# Patient Record
Sex: MALE | Race: OTHER | NOT HISPANIC OR LATINO | URBAN - METROPOLITAN AREA
[De-identification: names, ages, dates, MRNs, and addresses within clinical notes are randomized per-mention and may not be internally consistent; named-entity substitution may affect disease eponyms.]

---

## 2023-01-08 ENCOUNTER — EMERGENCY (EMERGENCY)
Facility: HOSPITAL | Age: 63
LOS: 0 days | Discharge: HOME | End: 2023-01-08
Attending: STUDENT IN AN ORGANIZED HEALTH CARE EDUCATION/TRAINING PROGRAM | Admitting: STUDENT IN AN ORGANIZED HEALTH CARE EDUCATION/TRAINING PROGRAM
Payer: MEDICAID

## 2023-01-08 VITALS
DIASTOLIC BLOOD PRESSURE: 63 MMHG | HEART RATE: 66 BPM | TEMPERATURE: 98 F | SYSTOLIC BLOOD PRESSURE: 117 MMHG | RESPIRATION RATE: 17 BRPM | OXYGEN SATURATION: 98 %

## 2023-01-08 VITALS
RESPIRATION RATE: 14 BRPM | OXYGEN SATURATION: 97 % | TEMPERATURE: 97 F | HEART RATE: 71 BPM | SYSTOLIC BLOOD PRESSURE: 139 MMHG | DIASTOLIC BLOOD PRESSURE: 73 MMHG
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
## 2023-01-08 DIAGNOSIS — S99.911A UNSPECIFIED INJURY OF RIGHT ANKLE, INITIAL ENCOUNTER: ICD-10-CM

## 2023-01-08 DIAGNOSIS — S89.81XA OTHER SPECIFIED INJURIES OF RIGHT LOWER LEG, INITIAL ENCOUNTER: ICD-10-CM

## 2023-01-08 DIAGNOSIS — G47.33 OBSTRUCTIVE SLEEP APNEA (ADULT) (PEDIATRIC): ICD-10-CM
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
## 2023-01-08 DIAGNOSIS — E11.9 TYPE 2 DIABETES MELLITUS WITHOUT COMPLICATIONS: ICD-10-CM

## 2023-01-08 DIAGNOSIS — J45.909 UNSPECIFIED ASTHMA, UNCOMPLICATED: ICD-10-CM

## 2023-01-08 DIAGNOSIS — Z87.891 PERSONAL HISTORY OF NICOTINE DEPENDENCE: ICD-10-CM

## 2023-01-08 PROCEDURE — 73562 X-RAY EXAM OF KNEE 3: CPT | Mod: 26,RT

## 2023-01-08 PROCEDURE — 73590 X-RAY EXAM OF LOWER LEG: CPT | Mod: 26,RT

## 2023-01-08 PROCEDURE — 99284 EMERGENCY DEPT VISIT MOD MDM: CPT | Mod: 25

## 2023-01-08 PROCEDURE — 29515 APPLICATION SHORT LEG SPLINT: CPT

## 2023-01-08 PROCEDURE — 73610 X-RAY EXAM OF ANKLE: CPT | Mod: 26,RT

## 2023-01-08 RX ORDER — IBUPROFEN 200 MG
600 TABLET ORAL ONCE
Refills: 0 | Status: COMPLETED | OUTPATIENT
Start: 2023-01-08 | End: 2023-01-08

## 2023-01-08 RX ADMIN — Medication 600 MILLIGRAM(S): at 15:48

## 2023-01-08 NOTE — ED PROVIDER NOTE - NSICDXPASTMEDICALHX_GEN_ALL_CORE_FT
What Type Of Note Output Would You Prefer (Optional)?: Bullet Format How Severe Is Your Acne?: moderate Is This A New Presentation, Or A Follow-Up?: Follow Up Acne PAST MEDICAL HISTORY:  Asthma     Diabetes     MAYLIN (obstructive sleep apnea)

## 2023-01-08 NOTE — ED ADULT NURSE NOTE - NSIMPLEMENTINTERV_GEN_ALL_ED
Implemented All Universal Safety Interventions:  [place] to call system. Call bell, personal items and telephone within reach. Instruct patient to call for assistance. Room bathroom lighting operational. Non-slip footwear when patient is off stretcher. Physically safe environment: no spills, clutter or unnecessary equipment. Stretcher in lowest position, wheels locked, appropriate side rails in place. Implemented All Fall with Harm Risk Interventions:  [place] to call system. Call bell, personal items and telephone within reach. Instruct patient to call for assistance. Room bathroom lighting operational. Non-slip footwear when patient is off stretcher. Physically safe environment: no spills, clutter or unnecessary equipment. Stretcher in lowest position, wheels locked, appropriate side rails in place. Provide visual cue, wrist band, yellow gown, etc. Monitor gait and stability. Monitor for mental status changes and reorient to person, place, and time. Review medications for side effects contributing to fall risk. Reinforce activity limits and safety measures with patient and family. Provide visual clues: red socks.

## 2023-01-08 NOTE — ED PROVIDER NOTE - PATIENT PORTAL LINK FT
You can access the FollowMyHealth Patient Portal offered by [facility] by registering at the following website: [email or web address]. By joining [facility]’s FollowMyHealth portal, you will also be able to view your health information using other applications (apps) compatible with our system.

## 2023-01-08 NOTE — ED PROVIDER NOTE - NSFOLLOWUPINSTRUCTIONS_ED_ALL_ED_FT
Our Emergency Department Referral Coordinators will be reaching out ot you in the next 24-48 hours from 9:00am to 5:00pm (Monday to Friday) with a follow up appointment. Please expect a phone call from the hospital in that time frame. If you do not receive a call or if you have any questions or concerns, you can reach them at [phone] or [phone].    Knee Pain    WHAT YOU NEED TO KNOW:    What do I need to know about knee pain? Knee pain may start suddenly, or it may be a long-term problem. You may have pain on the side, front, or back of your knee. You may have knee stiffness and swelling. You may hear popping sounds or feel like your knee is giving way or locking up as you walk. You may feel pain when you sit, stand, walk, or climb up and down stairs.    What increases my risk for knee pain?     Obesity      A strain or tear in ligaments or tendons      A leg fracture or knee dislocation      Overuse of your knee      Osteoarthritis, rheumatoid arthritis, or gout      An infection, tumor, or cyst in your knee      Shoes that are not supportive, or training on a hard surface      Sports that involve jumping or pivoting on your knee    How is the cause of knee pain diagnosed? Your healthcare provider will examine your knee and ask about your symptoms. Tell your provider when the pain started and what you were doing at the time. Describe the pain, such as sharp, throbbing, or achy. Tell your provider about any knee injury or surgery you had. You may need any of the following:     MRI, CT, or ultrasound pictures may show an injury, fracture, or tumor.       Blood tests may be used to check the level of inflammation in your blood. The tests may also show signs of infection.      Arthroscopy is a procedure to look inside your knee joint with an arthroscope. An arthroscope is a flexible tube with a light and camera on the end. A knee arthroscopy is usually done to check for disease or damage inside your knee. These problems may be fixed during the procedure.    How is knee pain treated? Treatment will depend on the cause of your pain. You may need any of the following:     NSAIDs help decrease swelling and pain or fever. This medicine is available with or without a doctor's order. NSAIDs can cause stomach bleeding or kidney problems in certain people. If you take blood thinner medicine, always ask your healthcare provider if NSAIDs are safe for you. Always read the medicine label and follow directions.      Acetaminophen decreases pain and fever. It is available without a doctor's order. Ask how much to take and how often to take it. Follow directions. Read the labels of all other medicines you are using to see if they also contain acetaminophen, or ask your doctor or pharmacist. Acetaminophen can cause liver damage if not taken correctly. Do not use more than 4 grams (4,000 milligrams) total of acetaminophen in one day.       Prescription pain medicine may be given. Ask your healthcare provider how to take this medicine safely. Some prescription pain medicines contain acetaminophen. Do not take other medicines that contain acetaminophen without talking to your healthcare provider. Too much acetaminophen may cause liver damage. Prescription pain medicine may cause constipation. Ask your healthcare provider how to prevent or treat constipation.       Steroid injections may be given into your knee. Steroids reduce inflammation and pain.      Surgery may be used for some injuries, such as to repair a torn ACL.    What can I do to manage my symptoms?     Rest your knee so it can heal. Limit activities that increase your pain. Do low-impact exercises, such as walking or swimming.       Apply ice to help reduce swelling and pain. Use an ice pack, or put crushed ice in a plastic bag. Cover it with a towel before you apply it to your knee. Apply ice for 15 to 20 minutes every hour, or as directed.      Apply compression to help reduce swelling. Use a brace or bandage only as directed.      Elevate your knee to help decrease pain and swelling. Elevate your knee while you are sitting or lying down. Prop your leg on pillows to keep your knee above the level of your heart.      Prevent your knee from moving as directed. Your healthcare provider may put on a cast or splint. You may need to wear a leg brace to stabilize your knee. A leg brace can be adjusted to increase your range of motion as your knee heals.Hinged Knee Braces          What can I do to prevent knee pain?     Maintain a healthy weight. Extra weight increases your risk for knee pain. Ask your healthcare provider how much you should weigh. He or she can help you create a safe weight loss plan if you need to lose weight.      Exercise or train properly. Use the correct equipment for sports. Wear shoes that provide good support. Check your posture often as you exercise, play sports, or train for an event. This can help prevent stress and strain on your knees. Rest between sessions so you do not overwork your knees.    When should I seek immediate care?     Your pain is worse, even after treatment.       You cannot bend or straighten your leg completely.       The swelling around your knee does not go down even with treatment.      Your knee is painful and hot to the touch.     When should I contact my healthcare provider?     You have questions or concerns about your condition or care.         CARE AGREEMENT:    You have the right to help plan your care. Learn about your health condition and how it may be treated. Discuss treatment options with your healthcare providers to decide what care you want to receive. You always have the right to refuse treatment.     Ankle Sprain    An ankle sprain is a stretch or tear in one of the tough, fiber-like tissues (ligaments) in the ankle. The ligaments in your ankle help to hold the bones of the ankle together.     CAUSES  This condition is often caused by stepping on or falling on the outer edge of the foot.    RISK FACTORS  This condition is more likely to develop in people who play sports.    SYMPTOMS  Symptoms of this condition include:    Pain in your ankle.  Swelling.  Bruising. Bruising may develop right after you sprain your ankle or 1–2 days later.  Trouble standing or walking, especially when you turn or change directions.    DIAGNOSIS  This condition is diagnosed with a physical exam. During the exam, your health care provider will press on certain parts of your foot and ankle and try to move them in certain ways. X-rays may be taken to see how severe the sprain is and to check for broken bones.    TREATMENT  This condition may be treated with:    A brace. This is used to keep the ankle from moving until it heals.  An elastic bandage. This is used to support the ankle.  Crutches.  Pain medicine.  Surgery. This may be needed if the sprain is severe.  Physical therapy. This may help to improve the range of motion in the ankle.    HOME CARE INSTRUCTIONS  Rest your ankle.  Take over-the-counter and prescription medicines only as told by your health care provider.  For 2–3 days, keep your ankle raised (elevated) above the level of your heart as much as possible.  If directed, apply ice to the area:  Put ice in a plastic bag.  Place a towel between your skin and the bag.  Leave the ice on for 20 minutes, 2–3 times a day.  If you were given a brace:  Wear it as directed.  Remove it to shower or bathe.  Try not to move your ankle much, but wiggle your toes from time to time. This helps to prevent swelling.  If you were given an elastic bandage (dressing):  Remove it to shower or bathe.  Try not to move your ankle much, but wiggle your toes from time to time. This helps to prevent swelling.  Adjust the dressing to make it more comfortable if it feels too tight.  Loosen the dressing if you have numbness or tingling in your foot, or if your foot becomes cold and blue.  If you have crutches, use them as told by your health care provider. Continue to use them until you can walk without feeling pain in your ankle.    SEEK MEDICAL CARE IF:  You have rapidly increasing bruising or swelling.  Your pain is not relieved with medicine.    SEEK IMMEDIATE MEDICAL CARE IF:  Your toes or foot becomes numb or blue.  You have severe pain that gets worse.    ADDITIONAL NOTES AND INSTRUCTIONS    Please follow up with your Primary MD in 24-48 hr.  Seek immediate medical care for any new/worsening signs or symptoms.

## 2023-01-08 NOTE — ED PROVIDER NOTE - OBJECTIVE STATEMENT
62-year-old male with history of diabetes, asthma, sleep apnea, glaucoma presents to the ED status post missed last step twisting right ankle and falling on knee.  Patient denies head trauma, LOC, dizziness or weakness.

## 2023-01-08 NOTE — ED PROVIDER NOTE - CLINICAL SUMMARY MEDICAL DECISION MAKING FREE TEXT BOX
.    63 y/o M pmh as noted, p/w  R ankle and knee pain s/p twisting ankle then falling on knee. No weakness numbness or other injury.    Pt is NAD; + swelling and ttp R lat mal; joints NL ROM; NL pulses, motor and sensory; + antalgic gait.    All available lab tests, imaging tests, and EKGs independently reviewed and interpreted by me. xray no frx or dislocation.    IMP: ankle sprain, knee pain.  splint applied.  Pt stable for dc w/ outpt f/up, and care as discussed.  Pt understands plan and signs and symptoms for ED return. DC home.     .

## 2023-01-08 NOTE — ED ADULT NURSE NOTE - CAS EDP DISCH TYPE
How Severe Is Your Skin Lesion?: mild
Have Your Skin Lesions Been Treated?: not been treated
Is This A New Presentation, Or A Follow-Up?: Skin Lesions
Home

## 2023-01-09 NOTE — ED PROCEDURE NOTE - NS ED FORMED SPLINTS 1
Dolor Abdominal    El dolor abdominal es dolor en el vientre o en la kvng del intestino. Todo el dion tiene tim tipo de dolor de vez en cuando. En muchos casos el dolor desaparece por sí solo. Kimberly en ciertas ocasiones el dolor abdominal puede ser consecuencia de un problema grave, sammi por ejemplo hazel apendicitis. Por lo tanto, es importante saber cuándo se debe obtener ayuda.  Causas del dolor abdominal  El dolor abdominal puede tener muchas causas. Entre las causas más comunes, en los adultos, se encuentran las siguientes:   · Estreñimiento, diarrea o gases  · Reflujo gastroesofágico (desplazamiento del ácido estomacal hacia el esófago, también llamado reflujo ácido o ardor estomacal)  · Úlcera péptica (lesión en el revestimiento interno del estómago o del intestino duarte)  · Inflamación de la vesícula biliar, el hígado o el páncreas  · Cálculos biliares o renales  · Apendicitis  · Obstrucción del intestino  · Hernia (protrusión o abultamiento de un órgano interno a través de un músculo u otro tejido)  · Infecciones de las vías urinarias  · En las mujeres, cólicos menstruales, fibromas o endometriosis del útero  · Inflamación o infección del intestino  Diagnóstico de la causa del dolor abdominal  Hoskins proveedor de atención médica le hará un examen para ayudar a determinar la causa de hoskins dolor. En elina necesario, le harán ciertas pruebas. El dolor abdominal puede ser difícil de diagnosticar porque freda causas pueden ser muy diversas. Los detalles que usted sepa gayle acerca de hoskins dolor pueden resultar útiles. Diga a hoskins proveedor de atención médica dónde y cuándo siente el dolor y qué cosas lo alivian o lo empeoran. Mencione también si tiene otros síntomas sammi fiebre, cansancio, náuseas, vómito o cambios en la frecuencia con que evacúa freda intestinos o hoskins vejiga.  Tratamiento del dolor abdominal  Ciertas causas de tim dolor, sammi hazel apendicitis o hazel obstrucción intestinal, requieren tratamiento  urgente. Otros problemas pueden tratarse mediante descanso, consumo de líquidos o medicamentos. Hoskins proveedor de atención médica le dará instrucciones específicas para el tratamiento que debe seguir o cómo debe cuidarse según la causa de hosikns dolor.   Si tiene vómito o diarrea, carolyn pequeños sorbos de agua u otros líquidos silvio. Cuando esté listo para comer de nuevo alimentos sólidos, empiece comiendo pequeñas cantidades de cosas fáciles de digerir, sammi puré de manzanas, pan henrik o galletas saladas.   Cuándo debe llamar al médico  Llame al 911 o vaya al \[facility] [place] [facility]\"" de inmediato si tiene alguno de los siguientes síntomas:  · No puede defecar y está vomitando  · Está vomitando jovanny o tiene diarrea de color negruzco o muy oscuro  · Tiene también dolor en el pecho, en el ayde o en el hombro  · Siente que está a punto de desmayarse  · Tiene dolor en los omóplatos, con náuseas  · Tiene un dolor repentino e insoportable en el abdomen  · Tiene un nuevo dolor distinto de todos los leora que ha tenido antes  · Tiene el vientre rígido, arben y sensible al tacto  Llame a hoskins médico si tiene:  · Dolor lisette más de 5 días  · Abotargamiento (sensación de llenura e inflamiento) lisette más de 2 días  · Diarrea lisette más de 5 días  · Fiebre superior a 101°F o más  · Dolor que continúa aumentando  · Pérdida de peso sin motivo aparente  · Falta de apetito persistente  · Jovanny en las heces  Cómo prevenir el dolor abdominal  Estos son algunos consejos para ayudar a prevenir el dolor abdominal:  · Coma cantidades más pequeñas de alimentos en cada comida.  · Evite los alimentos fritos o que contengan mucha grasa.  · Evite los alimentos que le producen gas.  · Roxanne ejercicio con regularidad.  · Carolyn abundantes líquidos.  Para ayudar a prevenir los síntomas del reflujo gastroesofágico:  · Deje de fumar.  · Carolyn menos alcohol y coma menos de esos alimentos que aumentan hoskins acidez estomacal.  · Pierda el exceso de peso.  · Termine de  comer sammi mínimo 2 horas antes de acostarse.  · Eleve un poco la cabecera de hoskins cama.  Date Last Reviewed: 3/30/2014  © 6569-0751 [employer]. [address] [place] [address], [place] [address]. Todos los derechos reservados. Esta información no pretende sustituir la atención médica profesional. Sólo hoskins médico puede diagnosticar y tratar un problema de magdi.         Posterior Splint

## 2023-01-13 PROBLEM — J45.909 UNSPECIFIED ASTHMA, UNCOMPLICATED: Chronic | Status: ACTIVE | Noted: 2023-01-08

## 2023-01-13 PROBLEM — E11.9 TYPE 2 DIABETES MELLITUS WITHOUT COMPLICATIONS: Chronic | Status: ACTIVE | Noted: 2023-01-08

## 2023-01-13 PROBLEM — G47.33 OBSTRUCTIVE SLEEP APNEA (ADULT) (PEDIATRIC): Chronic | Status: ACTIVE | Noted: 2023-01-08

## 2023-03-06 PROBLEM — Z00.00 ENCOUNTER FOR PREVENTIVE HEALTH EXAMINATION: Status: ACTIVE | Noted: 2023-03-06

## 2023-03-07 ENCOUNTER — APPOINTMENT (OUTPATIENT)
Dept: OPHTHALMOLOGY | Facility: CLINIC | Age: 63
End: 2023-03-07

## 2023-04-11 ENCOUNTER — APPOINTMENT (OUTPATIENT)
Dept: OPHTHALMOLOGY | Facility: CLINIC | Age: 63
End: 2023-04-11
Payer: MEDICAID

## 2023-04-11 ENCOUNTER — OUTPATIENT (OUTPATIENT)
Dept: OUTPATIENT SERVICES | Facility: HOSPITAL | Age: 63
LOS: 1 days | End: 2023-04-11
Payer: MEDICAID

## 2023-04-11 DIAGNOSIS — H53.8 OTHER VISUAL DISTURBANCES: ICD-10-CM

## 2023-04-11 PROCEDURE — 92133 CPTRZD OPH DX IMG PST SGM ON: CPT | Mod: 26

## 2023-04-11 PROCEDURE — 92133 CPTRZD OPH DX IMG PST SGM ON: CPT

## 2023-04-11 PROCEDURE — 92004 COMPRE OPH EXAM NEW PT 1/>: CPT

## 2023-04-18 DIAGNOSIS — H40.1134 PRIMARY OPEN-ANGLE GLAUCOMA, BILATERAL, INDETERMINATE STAGE: ICD-10-CM

## 2023-04-18 DIAGNOSIS — H17.13 CENTRAL CORNEAL OPACITY, BILATERAL: ICD-10-CM

## 2023-04-18 DIAGNOSIS — H18.13 BULLOUS KERATOPATHY, BILATERAL: ICD-10-CM

## 2023-04-18 DIAGNOSIS — H18.513 ENDOTHELIAL CORNEAL DYSTROPHY, BILATERAL: ICD-10-CM

## 2023-04-18 DIAGNOSIS — E11.9 TYPE 2 DIABETES MELLITUS WITHOUT COMPLICATIONS: ICD-10-CM

## 2023-10-17 ENCOUNTER — APPOINTMENT (OUTPATIENT)
Dept: OPHTHALMOLOGY | Facility: CLINIC | Age: 63
End: 2023-10-17

## 2025-06-05 ENCOUNTER — INPATIENT (INPATIENT)
Facility: HOSPITAL | Age: 65
LOS: 3 days | Discharge: HOME | DRG: 420 | End: 2025-06-09
Attending: STUDENT IN AN ORGANIZED HEALTH CARE EDUCATION/TRAINING PROGRAM | Admitting: STUDENT IN AN ORGANIZED HEALTH CARE EDUCATION/TRAINING PROGRAM
Payer: MEDICAID

## 2025-06-05 VITALS
HEART RATE: 83 BPM | SYSTOLIC BLOOD PRESSURE: 110 MMHG | DIASTOLIC BLOOD PRESSURE: 73 MMHG | TEMPERATURE: 99 F | WEIGHT: 235.01 LBS | OXYGEN SATURATION: 100 % | RESPIRATION RATE: 18 BRPM | HEIGHT: 71 IN

## 2025-06-05 DIAGNOSIS — R73.9 HYPERGLYCEMIA, UNSPECIFIED: ICD-10-CM

## 2025-06-05 LAB
ALBUMIN SERPL ELPH-MCNC: 4.2 G/DL — SIGNIFICANT CHANGE UP (ref 3.5–5.2)
ALP SERPL-CCNC: 71 U/L — SIGNIFICANT CHANGE UP (ref 30–115)
ALT FLD-CCNC: 27 U/L — SIGNIFICANT CHANGE UP (ref 0–41)
ANION GAP SERPL CALC-SCNC: 16 MMOL/L — HIGH (ref 7–14)
APPEARANCE UR: CLEAR — SIGNIFICANT CHANGE UP
AST SERPL-CCNC: 22 U/L — SIGNIFICANT CHANGE UP (ref 0–41)
B-OH-BUTYR SERPL-SCNC: 2.4 MMOL/L — HIGH
BASOPHILS # BLD AUTO: 0.01 K/UL — SIGNIFICANT CHANGE UP (ref 0–0.2)
BASOPHILS NFR BLD AUTO: 0.3 % — SIGNIFICANT CHANGE UP (ref 0–1)
BILIRUB SERPL-MCNC: 0.5 MG/DL — SIGNIFICANT CHANGE UP (ref 0.2–1.2)
BILIRUB UR-MCNC: NEGATIVE — SIGNIFICANT CHANGE UP
BUN SERPL-MCNC: 23 MG/DL — HIGH (ref 10–20)
CALCIUM SERPL-MCNC: 8.7 MG/DL — SIGNIFICANT CHANGE UP (ref 8.4–10.5)
CHLORIDE SERPL-SCNC: 102 MMOL/L — SIGNIFICANT CHANGE UP (ref 98–110)
CO2 SERPL-SCNC: 20 MMOL/L — SIGNIFICANT CHANGE UP (ref 17–32)
COLOR SPEC: YELLOW — SIGNIFICANT CHANGE UP
CREAT SERPL-MCNC: 1.3 MG/DL — SIGNIFICANT CHANGE UP (ref 0.7–1.5)
DIFF PNL FLD: ABNORMAL
EGFR: 62 ML/MIN/1.73M2 — SIGNIFICANT CHANGE UP
EGFR: 62 ML/MIN/1.73M2 — SIGNIFICANT CHANGE UP
EOSINOPHIL # BLD AUTO: 0 K/UL — SIGNIFICANT CHANGE UP (ref 0–0.7)
EOSINOPHIL NFR BLD AUTO: 0 % — SIGNIFICANT CHANGE UP (ref 0–8)
GAS PNL BLDV: SIGNIFICANT CHANGE UP
GLUCOSE BLDC GLUCOMTR-MCNC: 251 MG/DL — HIGH (ref 70–99)
GLUCOSE BLDC GLUCOMTR-MCNC: 334 MG/DL — HIGH (ref 70–99)
GLUCOSE SERPL-MCNC: 392 MG/DL — HIGH (ref 70–99)
GLUCOSE UR QL: >=1000 MG/DL
HCT VFR BLD CALC: 41.5 % — LOW (ref 42–52)
HGB BLD-MCNC: 14.3 G/DL — SIGNIFICANT CHANGE UP (ref 14–18)
IMM GRANULOCYTES NFR BLD AUTO: 0.3 % — SIGNIFICANT CHANGE UP (ref 0.1–0.3)
KETONES UR QL: 40 MG/DL
LEUKOCYTE ESTERASE UR-ACNC: NEGATIVE — SIGNIFICANT CHANGE UP
LIDOCAIN IGE QN: 90 U/L — HIGH (ref 7–60)
LYMPHOCYTES # BLD AUTO: 0.51 K/UL — LOW (ref 1.2–3.4)
LYMPHOCYTES # BLD AUTO: 15.7 % — LOW (ref 20.5–51.1)
MCHC RBC-ENTMCNC: 28.4 PG — SIGNIFICANT CHANGE UP (ref 27–31)
MCHC RBC-ENTMCNC: 34.5 G/DL — SIGNIFICANT CHANGE UP (ref 32–37)
MCV RBC AUTO: 82.3 FL — SIGNIFICANT CHANGE UP (ref 80–94)
MONOCYTES # BLD AUTO: 0.4 K/UL — SIGNIFICANT CHANGE UP (ref 0.1–0.6)
MONOCYTES NFR BLD AUTO: 12.3 % — HIGH (ref 1.7–9.3)
NEUTROPHILS # BLD AUTO: 2.31 K/UL — SIGNIFICANT CHANGE UP (ref 1.4–6.5)
NEUTROPHILS NFR BLD AUTO: 71.4 % — SIGNIFICANT CHANGE UP (ref 42.2–75.2)
NITRITE UR-MCNC: NEGATIVE — SIGNIFICANT CHANGE UP
NRBC BLD AUTO-RTO: 0 /100 WBCS — SIGNIFICANT CHANGE UP (ref 0–0)
PH UR: 6 — SIGNIFICANT CHANGE UP (ref 5–8)
PLATELET # BLD AUTO: 245 K/UL — SIGNIFICANT CHANGE UP (ref 130–400)
PMV BLD: 10.7 FL — HIGH (ref 7.4–10.4)
POTASSIUM SERPL-MCNC: 4.5 MMOL/L — SIGNIFICANT CHANGE UP (ref 3.5–5)
POTASSIUM SERPL-SCNC: 4.5 MMOL/L — SIGNIFICANT CHANGE UP (ref 3.5–5)
PROT SERPL-MCNC: 7.9 G/DL — SIGNIFICANT CHANGE UP (ref 6–8)
PROT UR-MCNC: NEGATIVE MG/DL — SIGNIFICANT CHANGE UP
RBC # BLD: 5.04 M/UL — SIGNIFICANT CHANGE UP (ref 4.7–6.1)
RBC # FLD: 12.6 % — SIGNIFICANT CHANGE UP (ref 11.5–14.5)
SODIUM SERPL-SCNC: 138 MMOL/L — SIGNIFICANT CHANGE UP (ref 135–146)
SP GR SPEC: 1.02 — SIGNIFICANT CHANGE UP (ref 1–1.03)
UROBILINOGEN FLD QL: 0.2 MG/DL — SIGNIFICANT CHANGE UP (ref 0.2–1)
WBC # BLD: 3.24 K/UL — LOW (ref 4.8–10.8)
WBC # FLD AUTO: 3.24 K/UL — LOW (ref 4.8–10.8)

## 2025-06-05 PROCEDURE — 80048 BASIC METABOLIC PNL TOTAL CA: CPT

## 2025-06-05 PROCEDURE — 70487 CT MAXILLOFACIAL W/DYE: CPT

## 2025-06-05 PROCEDURE — 82962 GLUCOSE BLOOD TEST: CPT

## 2025-06-05 PROCEDURE — D7140: CPT

## 2025-06-05 PROCEDURE — 84443 ASSAY THYROID STIM HORMONE: CPT

## 2025-06-05 PROCEDURE — 83735 ASSAY OF MAGNESIUM: CPT

## 2025-06-05 PROCEDURE — 85027 COMPLETE CBC AUTOMATED: CPT

## 2025-06-05 PROCEDURE — 94640 AIRWAY INHALATION TREATMENT: CPT

## 2025-06-05 PROCEDURE — 83036 HEMOGLOBIN GLYCOSYLATED A1C: CPT

## 2025-06-05 PROCEDURE — 80053 COMPREHEN METABOLIC PANEL: CPT

## 2025-06-05 PROCEDURE — D0140: CPT

## 2025-06-05 PROCEDURE — 85025 COMPLETE CBC W/AUTO DIFF WBC: CPT

## 2025-06-05 PROCEDURE — 71045 X-RAY EXAM CHEST 1 VIEW: CPT | Mod: 26

## 2025-06-05 PROCEDURE — 99223 1ST HOSP IP/OBS HIGH 75: CPT

## 2025-06-05 PROCEDURE — D0330: CPT

## 2025-06-05 PROCEDURE — 82010 KETONE BODYS QUAN: CPT

## 2025-06-05 PROCEDURE — D0230: CPT

## 2025-06-05 PROCEDURE — 36415 COLL VENOUS BLD VENIPUNCTURE: CPT

## 2025-06-05 PROCEDURE — 99285 EMERGENCY DEPT VISIT HI MDM: CPT

## 2025-06-05 PROCEDURE — 74177 CT ABD & PELVIS W/CONTRAST: CPT | Mod: 26

## 2025-06-05 RX ORDER — SODIUM CHLORIDE 9 G/1000ML
1000 INJECTION, SOLUTION INTRAVENOUS
Refills: 0 | Status: DISCONTINUED | OUTPATIENT
Start: 2025-06-05 | End: 2025-06-09

## 2025-06-05 RX ORDER — ACETAMINOPHEN 500 MG/5ML
650 LIQUID (ML) ORAL EVERY 6 HOURS
Refills: 0 | Status: DISCONTINUED | OUTPATIENT
Start: 2025-06-05 | End: 2025-06-09

## 2025-06-05 RX ORDER — DEXTROSE 50 % IN WATER 50 %
25 SYRINGE (ML) INTRAVENOUS ONCE
Refills: 0 | Status: DISCONTINUED | OUTPATIENT
Start: 2025-06-05 | End: 2025-06-09

## 2025-06-05 RX ORDER — ALBUTEROL SULFATE 2.5 MG/3ML
1 VIAL, NEBULIZER (ML) INHALATION DAILY
Refills: 0 | Status: DISCONTINUED | OUTPATIENT
Start: 2025-06-05 | End: 2025-06-09

## 2025-06-05 RX ORDER — MAGNESIUM, ALUMINUM HYDROXIDE 200-200 MG
30 TABLET,CHEWABLE ORAL EVERY 4 HOURS
Refills: 0 | Status: DISCONTINUED | OUTPATIENT
Start: 2025-06-05 | End: 2025-06-09

## 2025-06-05 RX ORDER — INSULIN GLARGINE-YFGN 100 [IU]/ML
25 INJECTION, SOLUTION SUBCUTANEOUS ONCE
Refills: 0 | Status: COMPLETED | OUTPATIENT
Start: 2025-06-05 | End: 2025-06-05

## 2025-06-05 RX ORDER — INSULIN LISPRO 100 U/ML
INJECTION, SOLUTION INTRAVENOUS; SUBCUTANEOUS
Refills: 0 | Status: DISCONTINUED | OUTPATIENT
Start: 2025-06-05 | End: 2025-06-09

## 2025-06-05 RX ORDER — ALBUTEROL SULFATE 2.5 MG/3ML
2 VIAL, NEBULIZER (ML) INHALATION EVERY 6 HOURS
Refills: 0 | Status: DISCONTINUED | OUTPATIENT
Start: 2025-06-05 | End: 2025-06-05

## 2025-06-05 RX ORDER — MELATONIN 5 MG
3 TABLET ORAL AT BEDTIME
Refills: 0 | Status: DISCONTINUED | OUTPATIENT
Start: 2025-06-05 | End: 2025-06-09

## 2025-06-05 RX ORDER — INSULIN LISPRO 100 U/ML
7 INJECTION, SOLUTION INTRAVENOUS; SUBCUTANEOUS
Refills: 0 | Status: DISCONTINUED | OUTPATIENT
Start: 2025-06-05 | End: 2025-06-06

## 2025-06-05 RX ORDER — DEXTROSE 50 % IN WATER 50 %
15 SYRINGE (ML) INTRAVENOUS ONCE
Refills: 0 | Status: DISCONTINUED | OUTPATIENT
Start: 2025-06-05 | End: 2025-06-09

## 2025-06-05 RX ORDER — HEPARIN SODIUM 1000 [USP'U]/ML
5000 INJECTION INTRAVENOUS; SUBCUTANEOUS EVERY 12 HOURS
Refills: 0 | Status: DISCONTINUED | OUTPATIENT
Start: 2025-06-05 | End: 2025-06-09

## 2025-06-05 RX ORDER — DEXTROSE 50 % IN WATER 50 %
12.5 SYRINGE (ML) INTRAVENOUS ONCE
Refills: 0 | Status: DISCONTINUED | OUTPATIENT
Start: 2025-06-05 | End: 2025-06-09

## 2025-06-05 RX ORDER — GLUCAGON 3 MG/1
1 POWDER NASAL ONCE
Refills: 0 | Status: DISCONTINUED | OUTPATIENT
Start: 2025-06-05 | End: 2025-06-09

## 2025-06-05 RX ORDER — KETOROLAC TROMETHAMINE 30 MG/ML
15 INJECTION, SOLUTION INTRAMUSCULAR; INTRAVENOUS ONCE
Refills: 0 | Status: DISCONTINUED | OUTPATIENT
Start: 2025-06-05 | End: 2025-06-05

## 2025-06-05 RX ORDER — ONDANSETRON HCL/PF 4 MG/2 ML
4 VIAL (ML) INJECTION EVERY 8 HOURS
Refills: 0 | Status: DISCONTINUED | OUTPATIENT
Start: 2025-06-05 | End: 2025-06-09

## 2025-06-05 RX ADMIN — Medication 1000 MILLILITER(S): at 14:39

## 2025-06-05 RX ADMIN — Medication 10 UNIT(S): at 19:21

## 2025-06-05 RX ADMIN — INSULIN GLARGINE-YFGN 25 UNIT(S): 100 INJECTION, SOLUTION SUBCUTANEOUS at 23:10

## 2025-06-05 RX ADMIN — Medication 1000 MILLILITER(S): at 18:41

## 2025-06-05 RX ADMIN — KETOROLAC TROMETHAMINE 15 MILLIGRAM(S): 30 INJECTION, SOLUTION INTRAMUSCULAR; INTRAVENOUS at 22:21

## 2025-06-05 NOTE — CONSULT NOTE ADULT - SUBJECTIVE AND OBJECTIVE BOX
GENERAL SURGERY CONSULT NOTE    Patient: LIUDMILA AVALOS , 65y (64)Male   MRN: [id number]  Location: [facility] ED  Visit: 25 Emergency  Date: 25 @ 20:08    HPI:  Patient is a 65 year old with a PMHx of DM1 who presents to the ED complaining of mild diffuse abdominal pain for past few days and multiple bouts of clear water like vomiting episodes since last night which prompted him to come to the ED today. Patient denies fevers, chills, night sweats, chest pain. Admits to diffuse mild abdominal pain and small amount of vomit last night. Passing gas and bowel movements yesterday and this morning. Physical exam is benign, soft, NT, ND. Imaging of the abdomen showing questionable SBO vs enteritis picture. Based on above history and exam, likely enteritis. Patient also has uncontrolled diabetes with sugars as high as 380s in ED.     PAST MEDICAL & SURGICAL HISTORY:  Diabetes      Asthma      MAYLIN (obstructive sleep apnea)          Home Medications:        VITALS:  T(F): 99 (25 @ 14:04), Max: 99 (25 @ 14:04)  HR: 83 (25 @ 14:04) (83 - 83)  BP: 110/73 (25 @ 14:04) (110/73 - 110/73)  RR: 18 (25 @ 14:04) (18 - 18)  SpO2: 100% (25 @ 14:04) (100% - 100%)    PHYSICAL EXAM:  General: NAD, AAOx3, calm and cooperative  Cardiac: RRR  Respiratory: normal respiratory effort  Abdomen: Soft, non-distended, non-tender, no rebound, no guarding.   Skin: Warm/dry, normal color, no jaundice      MEDICATIONS  (STANDING):    MEDICATIONS  (PRN):      LAB/STUDIES:                        14.3   3.24  )-----------( 245      ( 2025 14:34 )             41.5         138  |  102  |  23[H]  ----------------------------<  392[H]  4.5   |  20  |  1.3    Ca    8.7      2025 14:34    TPro  7.9  /  Alb  4.2  /  TBili  0.5  /  DBili  x   /  AST  22  /  ALT  27  /  AlkPhos  71  06-05      LIVER FUNCTIONS - ( 2025 14:34 )  Alb: 4.2 g/dL / Pro: 7.9 g/dL / ALK PHOS: 71 U/L / ALT: 27 U/L / AST: 22 U/L / GGT: x           Urinalysis Basic - ( 2025 16:33 )    Color: Yellow / Appearance: Clear / S.024 / pH: x  Gluc: x / Ketone: x  / Bili: Negative / Urobili: 0.2 mg/dL   Blood: x / Protein: Negative mg/dL / Nitrite: Negative   Leuk Esterase: Negative / RBC: 0 /HPF / WBC 0 /HPF   Sq Epi: x / Non Sq Epi: 1 /HPF / Bacteria: Negative /HPF      Urinalysis with Rflx Culture (collected 2025 16:33)      IMAGING:  < from: CT Abdomen and Pelvis w/ IV Cont (25 @ 16:19) >  CT ABDOMEN AND PELVIS IC   ORDERED BY: DON SANDHU     PROCEDURE DATE:  2025          INTERPRETATION:  CLINICAL INFORMATION: Abdominal pain. Vomiting diarrhea    COMPARISON: None.    CONTRAST/COMPLICATIONS:  IV Contrast:Omnipaque 350  95 cc administered   5 cc discarded  Oral Contrast: NONE.    PROCEDURE:  CT of the Abdomen and Pelvis was performed.  Sagittal and coronal reformats were performed.    FINDINGS:  LOWER CHEST: Within normal limits.    LIVER: Within normal limits.  BILE DUCTS: Normal caliber.  GALLBLADDER: Within normal limits.  SPLEEN: Within normal limits.  PANCREAS: Within normal limits.  ADRENALS: Within normal limits.  KIDNEYS/URETERS: Both kidneys demonstrate symmetric enhancement with no   hydronephrosis. There is hypodensities within the kidneys, some which are   too small to further characterize. In particular, there are hypodensities   within the left kidney which appear mildly hyperintense difficult to   evaluate. These measure up to 1.1 cm within the interpolar region 2.0 cm   in the upper pole.    BLADDER: Moderate distention the urinary bladder.  REPRODUCTIVE ORGANS: Ossifications are noted of the prostate gland. The   prostate gland measures 5.2 x 4.1 cm.    BOWEL: There are numerous mildly dilated loops of small bowel measuring   up to 3.1 cm. Relative area of transition is seen within the right lower   quadrant with more decompressed loops seen distally. Bowel loops to   appear to be thickened. Air and stool seen throughout the colon. Moderate   distention of the stomach with fluid.  PERITONEUM/RETROPERITONEUM: Within normal limits.  VESSELS: No aneurysmal dilatation. Mild atherosclerotic changes with   plaque.  LYMPH NODES: No lymphadenopathy.  ABDOMINAL WALL: Fat-containing left inguinal hernia. There is a focal   fat-containing umbilical hernia.  BONES: Degenerative changes. Disc space narrowing and osteophyte   formation appears most pronounced at L4-L5. Spinal stenosis and foraminal   stenosis appears most pronounced at L5-S1.    IMPRESSION:  Mildly dilated loops of small bowel with fluid. Relative area of   transition with collapsed loops of bowel seen distally. Findings may   reflect small bowel obstruction. Thickened bowel wall may reflect an   enteritis. Air and stool within the colon may reflect an early or partial   small bowel obstruction.      ASSESSMENT:  Patient is a 65 year old with a PMHx of DM1 who presents to the ED complaining of mild diffuse abdominal pain for past few days and multiple bouts of clear water like vomiting episodes since last night which prompted him to come to the ED today. Patient denies fevers, chills, night sweats, chest pain. Admits to diffuse mild abdominal pain and small amount of vomit last night. Passing gas and bowel movements yesterday and this morning. Physical exam is benign, soft, NT, ND. Imaging of the abdomen showing questionable SBO vs enteritis picture. Based on above history and exam, likely enteritis. Patient also has uncontrolled diabetes with sugars as high as 380s in ED.     Plan:  - Patient case and plan discussed with surgical attending  - No acute surgical intervention at this time  - Recommend medical admission for glucose control  - Surgery team to follow for now  - Hemodynamic monitoring  - Can have sips of clears for now and advance as tolerated  - Monitor BF     for surgery team    Above plan discussed with Attending Surgeon Dr. Manning , patient, patient family, and ED  25 @ 20:08

## 2025-06-05 NOTE — H&P ADULT - CONVERSATION DETAILS
Introduced concept of goals of care with patient at bedside. Discussed concepts of full medical management, limited medical care, and comfort measures. Explained concept of "full code" including need for compressions if patient were to arrest and need for intubation if patient experiencing severe respiratory distress or inability to protect airway. Additionally, explained concepts of DNR and DNI as well. Pt's  expressed understanding of all concepts. At this time, patient would like to be full code.

## 2025-06-05 NOTE — ED PROVIDER NOTE - OBJECTIVE STATEMENT
Patient is a 65-year-old male [facility] diabetes (on insulin) coming to ED for vomiting, diarrhea, abdominal pain.  Patient reports for the last 2 days has had generalized abdominal pain with multiple episodes of nonbloody nonbilious vomiting, and nonbloody diarrhea.  Patient endorses increased thirst and urinary frequency.  Reports compliance with insulin but blood sugar at home has been >400.  Denies full/trauma, fever, chest pain, shortness of breath, dysuria, hematuria

## 2025-06-05 NOTE — CONSULT NOTE ADULT - ATTENDING COMMENTS
This note reflects my exam and care of this patient on 6/05/2025.    This is 65 year old male with a PMHx of DM1 who presents to the ED complaining of mild diffuse abdominal pain for past few days and multiple bouts of clear water like vomiting episodes since last night which prompted him to come to the ED today. Patient denies fevers, chills, night sweats, chest pain. Admits to diffuse mild abdominal pain and small amount of vomit last night. Passing gas and bowel movements yesterday and today. Feels slightly distended.    PE:  AAO x4  Chest: clear,  CV : rrr  Abdomen: soft    I independently reviewed all images and labs:  CT Abd/Pelvis  shows mildly distended small bowel loops, gas in colon  Serum Glu 300s    PLAN:  Patient has clinical picture of enterocolitis.  His diarrhea is improving.  Now able to tolerated liquid diet  I recommend:  - liquid diet and advance as tolerated  - needs tight Glu control   - iv hydration  - DVT proph  Will follow up This note reflects my exam and care of this patient on 6/05/2025.    This is 65 year old male with a PMHx of DM1 who presents to the ED complaining of mild diffuse abdominal pain for past few days and multiple bouts of clear water like vomiting episodes since last night which prompted him to come to the ED today. Patient denies fevers, chills, night sweats, chest pain. Admits to diffuse mild abdominal pain and small amount of vomit last night. Passing gas and bowel movements yesterday and today. Feels slightly distended.    PE:  AAO x4  Chest: clear,  CV : rrr  Abdomen: soft    I independently reviewed all images and labs:  CT Abd/Pelvis  shows mildly distended small bowel loops, gas in colon  Serum Glu 300s    ASSESSMENT:  64 y/o male with Enterocolitis.  Abdominal distention.  Uncontrolled DM.  MAYLIN.    PLAN:  Patient has clinical picture of enterocolitis.  His diarrhea is improving.  Now able to tolerated liquid diet  I recommend:  - liquid diet and advance as tolerated  - needs tight Glu control   - iv hydration  - DVT proph  Will follow up

## 2025-06-05 NOTE — H&P ADULT - ASSESSMENT
65-year-old male PMH diabetes (on insulin) coming to ED for vomiting, diarrhea, abdominal pain.  Patient reports for the last 2 days has had generalized abdominal pain with multiple episodes of nonbloody nonbilious vomiting, and nonbloody diarrhea.     #Uncontrolled diabetes  #Hyperglycemia      # Small bowel obstruction  - Surgery consult recs 6/5:    - No acute surgical intervention at this time     - Recommend medical admission for glucose control    - Surgery team to follow for now    - Hemodynamic monitoring    - Can have sips of clears for now and advance as tolerated    - Monitor BF      -DVT prophylaxis: Heparin sqh  -GI prophylaxis: None  -Diet: Clear liquid diet  -Code status:  -Activity: IAT  -Dispo: medicine   65-year-old male PMH diabetes (on insulin) coming to ED for vomiting, diarrhea, abdominal pain.  Patient reports for the last 2 days has had generalized abdominal pain with multiple episodes of nonbloody nonbilious vomiting, and nonbloody diarrhea.     #Uncontrolled diabetes  #Hyperglycemia  - BHB:2.4 Glucose: 392 Anion gap: 16  - SSI  - F/u endocrinology consult  - F/u hA1c, lipid profile, BHB, and BMP    # Early vs partial Small bowel obstruction noted on CT A/P  #Diarrhea  # N/V- resolved  - Patient is passing gas, having BM,  - Surgery consult recs 6/5:    - No acute surgical intervention at this time     - Recommend medical admission for glucose control    - Surgery team to follow for now    - Hemodynamic monitoring    - Can have sips of clears for now and advance as tolerated    - Monitor BF  - Clear liquid diet    #Asthma  - Home:  proventil qdaily  - Continue proventil    #Tooth pain  - Patient has sharp tooth pain. Missed appt due to being at doctors office  - S/P IV Toradol 15  - Acetaminophen prn  - Monitor      -DVT prophylaxis: Heparin sqh  -GI prophylaxis: None  -Diet: Clear liquid diet  -Code status:  -Activity: IAT  -Dispo: medicine   65-year-old male PMH diabetes (on insulin) coming to ED for vomiting, diarrhea, abdominal pain.  Patient reports for the last 2 days has had generalized abdominal pain with multiple episodes of nonbloody nonbilious vomiting, and nonbloody diarrhea.     *** Please confirm med rec with pharmacy in AM as they are closed now. [facility]. [phone]***    #Uncontrolled diabetes  #Hyperglycemia  - Called wife to confirm insulin dose but no answer.  - BHB:2.4 Glucose: 392 Anion gap: 16  - SSI  - Start Lantus 25 unit stat  - Start lispro 7u TID.  - F/u endocrinology consult  - F/u hA1c, lipid profile, BHB, and BMP    # Early vs partial Small bowel obstruction noted on CT A/P  #Diarrhea  # N/V- resolved  - Patient is passing gas, having BM,  - Surgery consult recs 6/5:    - No acute surgical intervention at this time     - Recommend medical admission for glucose control    - Surgery team to follow for now    - Hemodynamic monitoring    - Can have sips of clears for now and advance as tolerated    - Monitor BF  - Clear liquid diet    #Asthma  - Home:  proventil qdaily  - Continue proventil    #Tooth pain  - Patient has sharp tooth pain. Missed appt due to being at doctors office  - S/P IV Toradol 15  - Acetaminophen prn  - Monitor      -DVT prophylaxis: Heparin sqh  -GI prophylaxis: None  -Diet: Clear liquid diet  -Code status:  -Activity: IAT  -Dispo: medicine

## 2025-06-05 NOTE — ED PROVIDER NOTE - DIFFERENTIAL DIAGNOSIS
Differential Diagnosis Abdominal pain r/o UTI/pyelo, kidney stone, obstruction, perforation, pancreatitis, abscess, appendicitis, ischemia, hepatobiliary abnormality or any other emergent intra-abdominal pathology.    Also hyperglycemia r/o DKA

## 2025-06-05 NOTE — ED PROVIDER NOTE - CLINICAL SUMMARY MEDICAL DECISION MAKING FREE TEXT BOX
Patient presented with nausea/vomiting and abdominal pain over the past 2 days.  States that he also has been having urinary frequency.  States that he is compliant with his insulin at home, but his blood sugars have been over 400.  Denies fever/chest pain or any other complaints.  On arrival, patient tender on abdominal exam but otherwise afebrile, hemodynamically stable.  Peers tach elevated in the 300s.  Obtained labs which were grossly unremarkable including no significant leukocytosis, anemia, signs of dehydration/ADENIKE, transaminitis or significant electrolyte abnormalities.  No direct evidence of DKA - beta hydroxy mildly elevated, but no anion gap, no evidence of acidosis.  UA negative for infection.  Chest xray negative for pneumothorax, pneumonia, widened mediastinum, evidence of rib fractures, enlarged cardiac silhouette or any other emergent pathologies.  CT of the abdomen and pelvis however showed possible early SBO as documented.  Surgery was consulted and evaluated the patient in the ED.  Per surgery, no emergent intervention at this time from their standpoint, but recommending medical admission for glycemic control and they will follow for serial exams.  Patient agreeable with plan.  Hemodynamically stable at time of admission.

## 2025-06-05 NOTE — H&P ADULT - HISTORY OF PRESENT ILLNESS
65-year-old male PMH diabetes (on insulin) coming to ED for vomiting, diarrhea, abdominal pain.  Patient reports for the last 2 days has had generalized abdominal pain with multiple episodes of nonbloody nonbilious vomiting, and nonbloody diarrhea.  Patient endorses increased thirst and urinary frequency.  Reports compliance with insulin but blood sugar at home has been >400.  Denies full/trauma, fever, chest pain, shortness of breath, dysuria, hematuria.    In the ED,     Vitals: Afebrile, HR 83, BP: 110/73, RR: 18, Satting at 100% RA  Labs: WBC: 3.24 Hb.3 Plt:245, Na:138 K:4.5 Crea:1.3 Anion Gap:16 BUN:23 Glucose 392, Lipase: 90, BHB:204  VBG: pH: 7.39 Base excess: -3.1 pCO2:35 HCO3:21  UA: 40 Ketone, trace blood, >1000 glucose  Imaging:  CXR: Neg for acute processes  CT A/P: Mildly dilated loops of small bowel with fluid. Relative area of   transition with collapsed loops of bowel seen distally. Findings may   reflect small bowel obstruction. Thickened bowel wall may reflect an   enteritis. Air and stool within the colon may reflect an early or partial   small bowel obstruction.    s/p:  1L sodium chloride bolus x2, 10 units of humilin. Admitted to medicine.     65-year-old male PMH diabetes (on insulin) and asthma coming to ED for vomiting, diarrhea, abdominal pain.  Patient reports for the last 2 days has had generalized abdominal pain with multiple episodes of nonbloody nonbilious vomiting, and nonbloody diarrhea.  Patient endorses increased thirst and urinary frequency.  Reports compliance with insulin but blood sugar at home has been >400.  Denies full/trauma, fever, chest pain, shortness of breath, dysuria, hematuria.    In the ED,     Vitals: Afebrile, HR 83, BP: 110/73, RR: 18, Satting at 100% RA  Labs: WBC: 3.24 Hb.3 Plt:245, Na:138 K:4.5 Crea:1.3 Anion Gap:16 BUN:23 Glucose 392, Lipase: 90, BHB:204  VBG: pH: 7.39 Base excess: -3.1 pCO2:35 HCO3:21  UA: 40 Ketone, trace blood, >1000 glucose  Imaging:  CXR: Neg for acute processes  CT A/P: Mildly dilated loops of small bowel with fluid. Relative area of   transition with collapsed loops of bowel seen distally. Findings may   reflect small bowel obstruction. Thickened bowel wall may reflect an   enteritis. Air and stool within the colon may reflect an early or partial   small bowel obstruction.    s/p:  1L sodium chloride bolus x2 and 10 units of humilin. Admitted to medicine.     65-year-old male PMH diabetes (on insulin) and asthma coming to ED for vomiting, diarrhea, abdominal pain.  Patient reports for the last 2 days has had generalized abdominal pain with multiple episodes of nonbloody nonbilious vomiting, and nonbloody diarrhea.  Patient endorses increased thirst and urinary frequency.  Reports noncompliance with insulin ( takes insulin prn, doesn't remember dose or how often he is supposed to take it) and blood sugar at home has been >400.  Denies full/trauma, fever, chest pain, shortness of breath, dysuria, hematuria.    In the ED,     Vitals: Afebrile, HR 83, BP: 110/73, RR: 18, Satting at 100% RA  Labs: WBC: 3.24 Hb.3 Plt:245, Na:138 K:4.5 Crea:1.3 Anion Gap:16 BUN:23 Glucose 392, Lipase: 90, BHB:204  VBG: pH: 7.39 Base excess: -3.1 pCO2:35 HCO3:21  UA: 40 Ketone, trace blood, >1000 glucose  Imaging:  CXR: Neg for acute processes  CT A/P: Mildly dilated loops of small bowel with fluid. Relative area of   transition with collapsed loops of bowel seen distally. Findings may   reflect small bowel obstruction. Thickened bowel wall may reflect an   enteritis. Air and stool within the colon may reflect an early or partial   small bowel obstruction.    s/p:  1L sodium chloride bolus x2 and 10 units of humalin. Admitted to medicine.

## 2025-06-05 NOTE — ED ADULT NURSE NOTE - OBJECTIVE STATEMENT
pt is complaining of n/v, diarrhea. States he has sweet taste in his mouth whenever he drinks water and has dry mouth.

## 2025-06-05 NOTE — H&P ADULT - NSHPLABSRESULTS_GEN_ALL_CORE
Labs: WBC: 3.24 Hb.3 Plt:245, Na:138 K:4.5 Crea:1.3 Anion Gap:16 BUN:23 Glucose 392, Lipase: 90, BHB:204  VBG: pH: 7.39 Base excess: -3.1 pCO2:35 HCO3:21  UA: 40 Ketone, trace blood, >1000 glucose  Imaging:  CXR: Neg for acute processes  CT A/P: Mildly dilated loops of small bowel with fluid. Relative area of   transition with collapsed loops of bowel seen distally. Findings may   reflect small bowel obstruction. Thickened bowel wall may reflect an   enteritis. Air and stool within the colon may reflect an early or partial   small bowel obstruction.

## 2025-06-05 NOTE — ED PROVIDER NOTE - ATTENDING SHARED VISIT SELECTOR YES
obtaining consent, the patient was placed in the examination chair in the upright position. Decongestant and topical anesthetic was sprayed in the bilateral nares and after allowing adequate time for hemostatic effect, the flexible 4 mm laryngoscope was passed via the nasal passage. Nasal Septum: There is a perforation of the posterior nasal septum along the floor  Nasal Findings: No active hemorrhage, no nasal masses appreciated   Nasopharynx: Clear, no masses or inflammation  Oropharynx: No exophytic or ulcerative lesions, mucosa appears within normal limits  Base of Tongue: Lingual tonsils within normal limits, vallecula without effacement  Epiglottis: Upright, in normal anatomical position  True Vocal Cord: Anatomically normal, no masses or inflammation, normal abduction and adduction upon inspiration and phonation  False Vocal Cord: normal appearing without masses  Hypopharynx Mucosa: + Mild to moderate postcricoid area edema  Arytenoids: Normal mucosa, no dislocation appreciated     * Patient tolerated the procedure well with no complications   * Patient was instructed not to eat for 30 minutes following procedure. * Patient was instructed that they may notice minor bleeding. Procedure: Binocular otomicroscopy with debridement of cerumen impaction    Pre-op: Cerumen impaction of the bilateral external auditory canals. Post op: Same  Procedure : Binocular otomicroscopy with debridement of cerumen of bilateral external auditory canals  Surgeon: Dr. Rudolph Kovacs DO  Estimated Blood Loss: None    Description of Procedure:    After obtaining verbal consent, the patient was placed in the examination chair in the reclined position.      -Right ear: External auditory canal with occluding cerumen limiting visualization of the tympanic membrane which was removed with use of #7 and #5 [place] suction, alligator forceps, and cerumen loop curet.   After successful removal of cerumen, the right tympanic Yes

## 2025-06-05 NOTE — ED PROVIDER NOTE - CONSIDERATION OF ADMISSION OBSERVATION
Patient with (+) possible early SBO on CT as well as uncontrolled hyperglycemia. Will require admission Consideration of Admission/Observation

## 2025-06-05 NOTE — H&P ADULT - NSHPPHYSICALEXAM_GEN_ALL_CORE
T(C): 37.2 (06-05-25 @ 21:02), Max: 37.2 (06-05-25 @ 14:04)  HR: 66 (06-05-25 @ 21:02) (66 - 83)  BP: 133/78 (06-05-25 @ 21:02) (110/73 - 133/78)  RR: 18 (06-05-25 @ 21:02) (18 - 18)  SpO2: 96% (06-05-25 @ 21:02) (96% - 100%)    CONSTITUTIONAL: Well groomed, no apparent distress  EYES:  EOMI, No conjunctival or scleral injection, non-icteric  ENMT: Oral mucosa with moist membranes.   NECK: Supple, symmetric  RESP: No respiratory distress, no use of accessory muscles; CTA b/l, no WRR  CV: RRR, +S1S2; no peripheral edema  GI: Soft, tenderness with gentle palpation in suprapubic area, ND, no rebound, no guarding; no palpable masses  MSK: Normal ROM without pain, no spinal tenderness, normal muscle strength/tone  SKIN: No rashes or ulcers noted  NEURO: Grossly intact  PSYCH: Appropriate insight/judgment; A+O x 3, mood and affect appropriate, recent/remote memory intact

## 2025-06-05 NOTE — H&P ADULT - ATTENDING COMMENTS
65-year-old male PMH diabetes (on insulin) coming to ED for vomiting, diarrhea, abdominal pain.  Patient reports for the last 2 days has had generalized abdominal pain with multiple episodes of nonbloody nonbilious vomiting, and nonbloody diarrhea.     #Uncontrolled diabetes  #Nausea/vomiting/diarrhea resolved  CTAP w IV 06/05 reviewed showing mildly dilated loops of small bowel; thickened bowel  FS better controlled  dw surgery team, endocrinology  - Pt having bowel movement, no interventions  - Increase lantus/lispro to 30-15-15-15  - ISS  - ADP 24h    #Asthma  - Home:  proventil qdaily  - Continue proventil    #Tooth pain  - Patient has sharp tooth pain. Missed appt due to being at doctors office  - S/P IV Toradol 15  - Acetaminophen prn  - Monitor  - Dental consult    DVT prophylaxis: Heparin sqh    #Progress Note Handoff  Pending (specify): Monitor FS  Family discussion: benson pt regarding plan of care  Disposition: GMF, from home

## 2025-06-05 NOTE — ED PROVIDER NOTE - PHYSICAL EXAMINATION
CONST: in NAD  EYES: EOMI, Sclera and conjunctiva clear.   ENT: No nasal discharge. Oropharynx normal appearing, no erythema or exudates. Uvula midline.  NECK: Non-tender  CARD: Normal S1 S2; Normal rate and rhythm  RESP: Equal BS B/L, No wheezes, rhonchi or rales. No distress  GI: Soft, non-distended, generalized TTP whole abdomen  MS: Normal ROM in all extremities. No midline spinal tenderness.  SKIN: Warm, dry, Good turgor  NEURO: A&Ox3, No focal deficits. Strength 5/5 with no sensory deficits. Steady gait

## 2025-06-06 LAB
A1C WITH ESTIMATED AVERAGE GLUCOSE RESULT: 12.3 % — HIGH (ref 4–5.6)
ALBUMIN SERPL ELPH-MCNC: 3.8 G/DL — SIGNIFICANT CHANGE UP (ref 3.5–5.2)
ALP SERPL-CCNC: 64 U/L — SIGNIFICANT CHANGE UP (ref 30–115)
ALT FLD-CCNC: 22 U/L — SIGNIFICANT CHANGE UP (ref 0–41)
ANION GAP SERPL CALC-SCNC: 13 MMOL/L — SIGNIFICANT CHANGE UP (ref 7–14)
AST SERPL-CCNC: 21 U/L — SIGNIFICANT CHANGE UP (ref 0–41)
B-OH-BUTYR SERPL-SCNC: 1.7 MMOL/L — HIGH
BASOPHILS # BLD AUTO: 0.01 K/UL — SIGNIFICANT CHANGE UP (ref 0–0.2)
BASOPHILS NFR BLD AUTO: 0.3 % — SIGNIFICANT CHANGE UP (ref 0–1)
BILIRUB SERPL-MCNC: 0.4 MG/DL — SIGNIFICANT CHANGE UP (ref 0.2–1.2)
BUN SERPL-MCNC: 17 MG/DL — SIGNIFICANT CHANGE UP (ref 10–20)
CALCIUM SERPL-MCNC: 8.7 MG/DL — SIGNIFICANT CHANGE UP (ref 8.4–10.5)
CHLORIDE SERPL-SCNC: 107 MMOL/L — SIGNIFICANT CHANGE UP (ref 98–110)
CO2 SERPL-SCNC: 22 MMOL/L — SIGNIFICANT CHANGE UP (ref 17–32)
CREAT SERPL-MCNC: 1 MG/DL — SIGNIFICANT CHANGE UP (ref 0.7–1.5)
EGFR: 85 ML/MIN/1.73M2 — SIGNIFICANT CHANGE UP
EGFR: 85 ML/MIN/1.73M2 — SIGNIFICANT CHANGE UP
EOSINOPHIL # BLD AUTO: 0.01 K/UL — SIGNIFICANT CHANGE UP (ref 0–0.7)
EOSINOPHIL NFR BLD AUTO: 0.3 % — SIGNIFICANT CHANGE UP (ref 0–8)
ESTIMATED AVERAGE GLUCOSE: 306 MG/DL — HIGH (ref 68–114)
GLUCOSE BLDC GLUCOMTR-MCNC: 189 MG/DL — HIGH (ref 70–99)
GLUCOSE BLDC GLUCOMTR-MCNC: 237 MG/DL — HIGH (ref 70–99)
GLUCOSE BLDC GLUCOMTR-MCNC: 262 MG/DL — HIGH (ref 70–99)
GLUCOSE BLDC GLUCOMTR-MCNC: 263 MG/DL — HIGH (ref 70–99)
GLUCOSE BLDC GLUCOMTR-MCNC: 264 MG/DL — HIGH (ref 70–99)
GLUCOSE SERPL-MCNC: 215 MG/DL — HIGH (ref 70–99)
HCT VFR BLD CALC: 39.3 % — LOW (ref 42–52)
HGB BLD-MCNC: 13.5 G/DL — LOW (ref 14–18)
IMM GRANULOCYTES NFR BLD AUTO: 0.3 % — SIGNIFICANT CHANGE UP (ref 0.1–0.3)
LYMPHOCYTES # BLD AUTO: 0.82 K/UL — LOW (ref 1.2–3.4)
LYMPHOCYTES # BLD AUTO: 23 % — SIGNIFICANT CHANGE UP (ref 20.5–51.1)
MAGNESIUM SERPL-MCNC: 2.5 MG/DL — HIGH (ref 1.8–2.4)
MCHC RBC-ENTMCNC: 28.4 PG — SIGNIFICANT CHANGE UP (ref 27–31)
MCHC RBC-ENTMCNC: 34.4 G/DL — SIGNIFICANT CHANGE UP (ref 32–37)
MCV RBC AUTO: 82.7 FL — SIGNIFICANT CHANGE UP (ref 80–94)
MONOCYTES # BLD AUTO: 0.43 K/UL — SIGNIFICANT CHANGE UP (ref 0.1–0.6)
MONOCYTES NFR BLD AUTO: 12 % — HIGH (ref 1.7–9.3)
NEUTROPHILS # BLD AUTO: 2.29 K/UL — SIGNIFICANT CHANGE UP (ref 1.4–6.5)
NEUTROPHILS NFR BLD AUTO: 64.1 % — SIGNIFICANT CHANGE UP (ref 42.2–75.2)
NRBC BLD AUTO-RTO: 0 /100 WBCS — SIGNIFICANT CHANGE UP (ref 0–0)
PLATELET # BLD AUTO: 200 K/UL — SIGNIFICANT CHANGE UP (ref 130–400)
PMV BLD: 10.6 FL — HIGH (ref 7.4–10.4)
POTASSIUM SERPL-MCNC: 4.4 MMOL/L — SIGNIFICANT CHANGE UP (ref 3.5–5)
POTASSIUM SERPL-SCNC: 4.4 MMOL/L — SIGNIFICANT CHANGE UP (ref 3.5–5)
PROT SERPL-MCNC: 6.5 G/DL — SIGNIFICANT CHANGE UP (ref 6–8)
RBC # BLD: 4.75 M/UL — SIGNIFICANT CHANGE UP (ref 4.7–6.1)
RBC # FLD: 12.6 % — SIGNIFICANT CHANGE UP (ref 11.5–14.5)
SODIUM SERPL-SCNC: 142 MMOL/L — SIGNIFICANT CHANGE UP (ref 135–146)
TSH SERPL-MCNC: 0.48 UIU/ML — SIGNIFICANT CHANGE UP (ref 0.27–4.2)
WBC # BLD: 3.57 K/UL — LOW (ref 4.8–10.8)
WBC # FLD AUTO: 3.57 K/UL — LOW (ref 4.8–10.8)

## 2025-06-06 PROCEDURE — 99232 SBSQ HOSP IP/OBS MODERATE 35: CPT

## 2025-06-06 PROCEDURE — 99222 1ST HOSP IP/OBS MODERATE 55: CPT

## 2025-06-06 PROCEDURE — 99221 1ST HOSP IP/OBS SF/LOW 40: CPT

## 2025-06-06 RX ORDER — INSULIN GLARGINE-YFGN 100 [IU]/ML
35 INJECTION, SOLUTION SUBCUTANEOUS AT BEDTIME
Refills: 0 | Status: DISCONTINUED | OUTPATIENT
Start: 2025-06-06 | End: 2025-06-07

## 2025-06-06 RX ORDER — BRIMONIDINE TARTRATE, TIMOLOL MALEATE 2; 5 MG/ML; MG/ML
1 SOLUTION/ DROPS TOPICAL
Refills: 0 | DISCHARGE

## 2025-06-06 RX ORDER — TIMOLOL MALEATE 6.8 MG/ML
1 SOLUTION OPHTHALMIC DAILY
Refills: 0 | Status: DISCONTINUED | OUTPATIENT
Start: 2025-06-06 | End: 2025-06-09

## 2025-06-06 RX ORDER — POLYETHYLENE GLYCOL 3350 17 G/17G
17 POWDER, FOR SOLUTION ORAL
Refills: 0 | Status: DISCONTINUED | OUTPATIENT
Start: 2025-06-06 | End: 2025-06-09

## 2025-06-06 RX ORDER — INSULIN LISPRO 100 U/ML
15 INJECTION, SOLUTION INTRAVENOUS; SUBCUTANEOUS
Refills: 0 | Status: DISCONTINUED | OUTPATIENT
Start: 2025-06-06 | End: 2025-06-08

## 2025-06-06 RX ORDER — BRIMONIDINE TARTRATE 1.5 MG/ML
1 SOLUTION/ DROPS OPHTHALMIC DAILY
Refills: 0 | Status: DISCONTINUED | OUTPATIENT
Start: 2025-06-06 | End: 2025-06-09

## 2025-06-06 RX ADMIN — HEPARIN SODIUM 5000 UNIT(S): 1000 INJECTION INTRAVENOUS; SUBCUTANEOUS at 17:08

## 2025-06-06 RX ADMIN — Medication 650 MILLIGRAM(S): at 17:20

## 2025-06-06 RX ADMIN — Medication 3 MILLIGRAM(S): at 22:22

## 2025-06-06 RX ADMIN — POLYETHYLENE GLYCOL 3350 17 GRAM(S): 17 POWDER, FOR SOLUTION ORAL at 16:27

## 2025-06-06 RX ADMIN — HEPARIN SODIUM 5000 UNIT(S): 1000 INJECTION INTRAVENOUS; SUBCUTANEOUS at 06:00

## 2025-06-06 RX ADMIN — INSULIN LISPRO 2: 100 INJECTION, SOLUTION INTRAVENOUS; SUBCUTANEOUS at 18:25

## 2025-06-06 RX ADMIN — Medication 1 PUFF(S): at 09:56

## 2025-06-06 RX ADMIN — INSULIN LISPRO 7 UNIT(S): 100 INJECTION, SOLUTION INTRAVENOUS; SUBCUTANEOUS at 09:01

## 2025-06-06 RX ADMIN — INSULIN LISPRO 7 UNIT(S): 100 INJECTION, SOLUTION INTRAVENOUS; SUBCUTANEOUS at 12:54

## 2025-06-06 RX ADMIN — INSULIN GLARGINE-YFGN 35 UNIT(S): 100 INJECTION, SOLUTION SUBCUTANEOUS at 22:23

## 2025-06-06 RX ADMIN — INSULIN LISPRO 6: 100 INJECTION, SOLUTION INTRAVENOUS; SUBCUTANEOUS at 09:00

## 2025-06-06 RX ADMIN — INSULIN LISPRO 6: 100 INJECTION, SOLUTION INTRAVENOUS; SUBCUTANEOUS at 12:53

## 2025-06-06 RX ADMIN — POLYETHYLENE GLYCOL 3350 17 GRAM(S): 17 POWDER, FOR SOLUTION ORAL at 22:22

## 2025-06-06 NOTE — PATIENT PROFILE ADULT - NSPRONUTRITIONRISK_GEN_A_NUR
Carli it looks like Ms Calhoun has scheduled her EGD and colonoscopy with Dr Jama. I will cancel orders in her chart. Thank you. buck   No indicators present

## 2025-06-06 NOTE — PROGRESS NOTE ADULT - ATTENDING COMMENTS
Patient feels better this am.  Tolerates liquid diet and has gas and BMs.  Glu still >250    PE:  AAO x4  Chest: clear,  CV : rrr  Abdomen: soft    ASSESSMENT:  64 y/o male with Enterocolitis.  Abdominal distention.  Uncontrolled DM.  MAYLIN.    PLAN:  - advance po diet as tolerated  - encourage incentive spirometer  - needs Glu control  - DVT proph

## 2025-06-06 NOTE — CONSULT NOTE ADULT - ASSESSMENT
#DM II   - A1c 12.3. BHB 1.7 (from 2.4), no gap now   - On home taking ozempic 0.5 qWeekly and Long acting insulin 25 qhs   - currently on Lantus 25 and lispro 7 TID   - Increase Lantus to 30 qhs, and lispro 15 TID and ISS   - Discharge patient on Basal-bolus insulin regimen that controls his FS inpatient. hold ozempic on dc given concerns for Ileus/bowel obstruction

## 2025-06-06 NOTE — PATIENT PROFILE ADULT - FALL HARM RISK - HARM RISK INTERVENTIONS

## 2025-06-06 NOTE — PATIENT PROFILE ADULT - STATED REASON FOR ADMISSION
Tanmay Addison is a female infant, delivered at Gestational Age: 39w4d on 2019    Delivery Information:  Delivery Method: Vaginal, Spontaneous [250]  Rupture Date: 2021  Rupture Time: 3:13 PM  Time of Birth: 6:23 PM    Apgars:    1 Minute: 8   5 Minutes: 9     Birth Measurements:  Weight:    8 lb 10 oz (3912 g)  Length: 19.5\"  Head circumference:      Feeding:breast feed    Prenatal Labs  Information for the patient's mother:  Alessandra Addison [[id number]]     CULTURE, STREPTOCOCCUS GROUP B WITH SENSITIVITIES (PENICILLIN ALLERGIC) (no units)   Date Value   2021     Negative for  Streptococcus agalactiae (Strep group B)      No antibiotics needed.    Information for the patient's mother:  Alessandra Addison [[id number]]     Recent Labs   Lab 21  1014 21  1512 21  1512 21  1436   HIV Antigen/ Antibody Combo Screen  --   --  Nonreactive  --    Hepatitis B Surface Antigen  --   --  Negative  --    Treponema Pallidum Antibody, IgG and IgM Nonreactive   < > Nonreactive  --    Neisseria gonorrhoeae by Nucleic Acid Amplification  --   --   --  Negative   Chlamydia trachomatis by Nucleic Acid Amplification  --   --   --  Negative   Rubella Antibody, IgG  --   --  16.9  --     < > = values in this interval not displayed.        Significant maternal history: Diabetes/ Gestational Diabetes  Events of labor and delivery: none   Sepsis Risk Score: 0.06   dizziness, high sugar levels, came to the ED,  RT JAW PAIN

## 2025-06-06 NOTE — CONSULT NOTE ADULT - SUBJECTIVE AND OBJECTIVE BOX
Patient is a 65y old  Male who presents with a chief complaint of hyperglycemia and abd pain (2025 14:30)      HPI:  65-year-old male PMH diabetes (on insulin) and asthma coming to ED for vomiting, diarrhea, abdominal pain.  Patient reports for the last 2 days has had generalized abdominal pain with multiple episodes of nonbloody nonbilious vomiting, and nonbloody diarrhea.  Patient endorses increased thirst and urinary frequency.  Reports noncompliance with insulin ( takes insulin prn, doesn't remember dose or how often he is supposed to take it) and blood sugar at home has been >400.  Denies full/trauma, fever, chest pain, shortness of breath, dysuria, hematuria.    In the ED,     Vitals: Afebrile, HR 83, BP: 110/73, RR: 18, Satting at 100% RA  Labs: WBC: 3.24 Hb.3 Plt:245, Na:138 K:4.5 Crea:1.3 Anion Gap:16 BUN:23 Glucose 392, Lipase: 90, BHB:204  VBG: pH: 7.39 Base excess: -3.1 pCO2:35 HCO3:21  UA: 40 Ketone, trace blood, >1000 glucose  Imaging:  CXR: Neg for acute processes  CT A/P: Mildly dilated loops of small bowel with fluid. Relative area of   transition with collapsed loops of bowel seen distally. Findings may   reflect small bowel obstruction. Thickened bowel wall may reflect an   enteritis. Air and stool within the colon may reflect an early or partial   small bowel obstruction.    s/p:  1L sodium chloride bolus x2 and 10 units of humalin. Admitted to medicine.     (2025 21:19)      PAST MEDICAL & SURGICAL HISTORY:  Diabetes      Asthma      MAYLIN (obstructive sleep apnea)        ( - ) heart valve replacement  ( - ) joint replacement  ( - ) pregnancy    MEDICATIONS  (STANDING):  albuterol    90 MICROgram(s) HFA Inhaler 1 Puff(s) Inhalation daily  brimonidine 0.2% Ophthalmic Solution 1 Drop(s) Both EYES daily  dextrose 5%. 1000 milliLiter(s) (50 mL/Hr) IV Continuous <Continuous>  dextrose 5%. 1000 milliLiter(s) (100 mL/Hr) IV Continuous <Continuous>  dextrose 50% Injectable 25 Gram(s) IV Push once  dextrose 50% Injectable 12.5 Gram(s) IV Push once  dextrose 50% Injectable 25 Gram(s) IV Push once  dextrose 50% Injectable 25 Gram(s) IV Push once  glucagon  Injectable 1 milliGRAM(s) IntraMuscular once  heparin   Injectable 5000 Unit(s) SubCutaneous every 12 hours  insulin glargine Injectable (LANTUS) 35 Unit(s) SubCutaneous at bedtime  insulin lispro (ADMELOG) corrective regimen sliding scale   SubCutaneous three times a day before meals  insulin lispro Injectable (ADMELOG) 15 Unit(s) SubCutaneous three times a day before meals  timolol 0.5% Solution 1 Drop(s) Both EYES daily    MEDICATIONS  (PRN):  acetaminophen     Tablet .. 650 milliGRAM(s) Oral every 6 hours PRN Temp greater or equal to 38C (100.4F), Mild Pain (1 - 3)  aluminum hydroxide/magnesium hydroxide/simethicone Suspension 30 milliLiter(s) Oral every 4 hours PRN Dyspepsia  dextrose Oral Gel 15 Gram(s) Oral once PRN Blood Glucose LESS THAN 70 milliGRAM(s)/deciliter  melatonin 3 milliGRAM(s) Oral at bedtime PRN Insomnia  ondansetron Injectable 4 milliGRAM(s) IV Push every 8 hours PRN Nausea and/or Vomiting  oxycodone    5 mG/acetaminophen 325 mG 1 Tablet(s) Oral every 6 hours PRN Severe Pain (7 - 10)  polyethylene glycol 3350 17 Gram(s) Oral two times a day PRN Constipation      Allergies    No Known Allergies    Intolerances        FAMILY HISTORY:      *SOCIAL HISTORY: (   ) Tobacco; (   ) ETOH    *Last Dental Visit:    Vital Signs Last 24 Hrs  T(C): 36.7 (2025 20:00), Max: 37.2 (2025 21:02)  T(F): 98 (2025 20:00), Max: 98.9 (2025 21:02)  HR: 69 (2025 20:00) (62 - 69)  BP: 154/68 (2025 20:00) (133/68 - 154/68)  BP(mean): 96 (2025 21:02) (96 - 96)  RR: 18 (2025 20:00) (18 - 18)  SpO2: 98% (2025 20:00) (95% - 98%)    Parameters below as of 2025 15:51  Patient On (Oxygen Delivery Method): room air        LABS:                        13.5   3.57  )-----------( 200      ( 2025 07:28 )             39.3     -    142  |  107  |  17  ----------------------------<  215[H]  4.4   |  22  |  1.0    Ca    8.7      2025 07:28  Mg     2.5         TPro  6.5  /  Alb  3.8  /  TBili  0.4  /  DBili  x   /  AST  21  /  ALT  22  /  AlkPhos  64  06-    WBC Count: 3.57 K/uL *L* [4.80 - 10.80] ( @ 07:28)  Platelet Count - Automated: 200 K/uL [130 - 400] ( @ 07:28)  Platelet Count - Automated: 245 K/uL [130 - 400] ( @ 14:34)  WBC Count: 3.24 K/uL *L* [4.80 - 10.80] ( @ 14:34)    Urinalysis Basic - ( 2025 07:28 )    Color: x / Appearance: x / SG: x / pH: x  Gluc: 215 mg/dL / Ketone: x  / Bili: x / Urobili: x   Blood: x / Protein: x / Nitrite: x   Leuk Esterase: x / RBC: x / WBC x   Sq Epi: x / Non Sq Epi: x / Bacteria: x          EOE:  TMJ ( - ) clicks                     ( - ) pops                     ( - ) crepitus             Mandible <<FROM>>             Facial bones and MOM <<grossly intact>>             ( - ) trismus             ( - ) lymphadenopathy             ( + ) swelling             ( - ) asymmetry             ( + ) palpation             ( - ) dyspnea             ( - ) dysphagia             ( - ) loss of consciousness    IOE:  <<permanent>> dentition: <<multiple carious teeth>>           hard/soft palate: <<No pathology noted>>           tongue/FOM <<No pathology noted>>           labial/buccal mucosa <<No pathology noted>>           ( + ) percussion           ( + ) palpation           ( + ) swelling            ( - ) abscess           ( - ) sinus tract    Dentition present: <<y>>  Mobility: <<n>>  Caries: <<y>>         *DENTAL RADIOGRAPHS: None taken    RADIOLOGY & ADDITIONAL STUDIES: N?A    *ASSESSMENT: Nonrestorable tooth #4      *PLAN: No emergent dental care needed at this time. Continue antibiotics and analgesics as necessary for the pain.    PROCEDURE:   Intraoral and extraoral examination completed.  Treatment: Limited Oral Examination    RECOMMENDATIONS:  1) Complete antiobiotic course as prescribed and analgesics as necessary for pain.  2) Dental F/U with [facility] dental clinic Monday for examination and treatment.    Resident Name: Bakari Hollis (SUSAN), dental resident

## 2025-06-06 NOTE — CONSULT NOTE ADULT - SUBJECTIVE AND OBJECTIVE BOX
HPI:  65-year-old male PMH diabetes (on insulin) and asthma coming to ED for vomiting, diarrhea, abdominal pain.  Patient reports for the last 2 days has had generalized abdominal pain with multiple episodes of nonbloody nonbilious vomiting, and nonbloody diarrhea.  Patient endorses increased thirst and urinary frequency.  Reports noncompliance with insulin ( takes insulin prn, doesn't remember dose or how often he is supposed to take it) and blood sugar at home has been >400.  Denies full/trauma, fever, chest pain, shortness of breath, dysuria, hematuria.    In the ED,     Vitals: Afebrile, HR 83, BP: 110/73, RR: 18, Satting at 100% RA  Labs: WBC: 3.24 Hb.3 Plt:245, Na:138 K:4.5 Crea:1.3 Anion Gap:16 BUN:23 Glucose 392, Lipase: 90, BHB:204  VBG: pH: 7.39 Base excess: -3.1 pCO2:35 HCO3:21  UA: 40 Ketone, trace blood, >1000 glucose  Imaging:  CXR: Neg for acute processes  CT A/P: Mildly dilated loops of small bowel with fluid. Relative area of   transition with collapsed loops of bowel seen distally. Findings may   reflect small bowel obstruction. Thickened bowel wall may reflect an   enteritis. Air and stool within the colon may reflect an early or partial   small bowel obstruction.    s/p:  1L sodium chloride bolus x2 and 10 units of humalin. Admitted to medicine.     (2025 21:19)      PAST MEDICAL & SURGICAL HISTORY  Diabetes    Asthma    MAYLIN (obstructive sleep apnea)        FAMILY HISTORY:  FAMILY HISTORY:      SOCIAL HISTORY:  []smoker  []Alcohol  []Drug    ALLERGIES:  No Known Allergies      MEDICATIONS:  MEDICATIONS  (STANDING):  albuterol    90 MICROgram(s) HFA Inhaler 1 Puff(s) Inhalation daily  brimonidine 0.2% Ophthalmic Solution 1 Drop(s) Both EYES daily  dextrose 5%. 1000 milliLiter(s) (50 mL/Hr) IV Continuous <Continuous>  dextrose 5%. 1000 milliLiter(s) (100 mL/Hr) IV Continuous <Continuous>  dextrose 50% Injectable 25 Gram(s) IV Push once  dextrose 50% Injectable 12.5 Gram(s) IV Push once  dextrose 50% Injectable 25 Gram(s) IV Push once  dextrose 50% Injectable 25 Gram(s) IV Push once  glucagon  Injectable 1 milliGRAM(s) IntraMuscular once  heparin   Injectable 5000 Unit(s) SubCutaneous every 12 hours  insulin glargine Injectable (LANTUS) 35 Unit(s) SubCutaneous at bedtime  insulin lispro (ADMELOG) corrective regimen sliding scale   SubCutaneous three times a day before meals  insulin lispro Injectable (ADMELOG) 7 Unit(s) SubCutaneous three times a day before meals  timolol 0.5% Solution 1 Drop(s) Both EYES daily    MEDICATIONS  (PRN):  acetaminophen     Tablet .. 650 milliGRAM(s) Oral every 6 hours PRN Temp greater or equal to 38C (100.4F), Mild Pain (1 - 3)  aluminum hydroxide/magnesium hydroxide/simethicone Suspension 30 milliLiter(s) Oral every 4 hours PRN Dyspepsia  dextrose Oral Gel 15 Gram(s) Oral once PRN Blood Glucose LESS THAN 70 milliGRAM(s)/deciliter  melatonin 3 milliGRAM(s) Oral at bedtime PRN Insomnia  ondansetron Injectable 4 milliGRAM(s) IV Push every 8 hours PRN Nausea and/or Vomiting  oxycodone    5 mG/acetaminophen 325 mG 1 Tablet(s) Oral every 6 hours PRN Severe Pain (7 - 10)      HOME MEDICATIONS:  Home Medications:  Albuterol (Eqv-Proventil HFA) 90 mcg/inh inhalation aerosol: 2 puff(s) inhaled once a day (2025 13:04)  Basaglar KwikPen 100 units/mL subcutaneous solution: 25 international unit(s) subcutaneous once a day (at bedtime) (2025 13:04)  Combigan 0.2%-0.5% ophthalmic solution: 1 drop(s) in each affected eye once a day (2025 13:04)  semaglutide 0.25 mg/0.5 mL (0.25 mg dose) subcutaneous solution: 0.5 milligram(s) subcutaneously once a week (2025 13:04)      VITALS:   T(F): 98.1 ( @ 07:57), Max: 99 ( @ 14:04)  HR: 65 ( @ 07:57) (65 - 83)  BP: 133/68 ( @ 07:57) (110/73 - 137/71)  BP(mean): 96 ( @ 21:02) (96 - 96)  RR: 18 ( @ 07:57) (18 - 18)  SpO2: 95% ( @ 07:57) (95% - 100%)    I&O's Summary      REVIEW OF SYSTEMS:  CONSTITUTIONAL: No weakness, fevers or chills  EYES: No visual changes  ENT: No vertigo or throat pain   NECK: No pain or stiffness  RESPIRATORY: No cough, wheezing, hemoptysis; No shortness of breath  CARDIOVASCULAR: No chest pain or palpitations  GASTROINTESTINAL: No abdominal or epigastric pain. No nausea, vomiting, or hematemesis; No diarrhea or constipation. No melena or hematochezia.  GENITOURINARY: No Polyuria  NEUROLOGICAL:  No tremors, no Weakness or numbness  SKIN: No itching, no rashes  MSK: no joint pain    PHYSICAL EXAM:  GENERAL: Patient is awake , alert and oriented,  not in acute distress  EYES: No proptosis, no lid lag  NECK: No thyroid enlargement, no palpable nodules , no bruit  LUNGS: Clear to auscultation bilaterally   CARDIOVASCULAR: S1/S2 present, RRR , no murmurs or rubs  ABD: Soft, non-tender, non-distended, +BS  EXT: No IKER  SKIN: No abdominal striae  NEURO: No tremors, DTR 2+    LABS:                        13.5   3.57  )-----------( 200      ( 2025 07:28 )             39.3         142  |  107  |  17  ----------------------------<  215[H]  4.4   |  22  |  1.0    Ca    8.7      2025 07:28  Mg     2.5         TPro  6.5  /  Alb  3.8  /  TBili  0.4  /  DBili  x   /  AST  21  /  ALT  22  /  AlkPhos  64                POCT Blood Glucose.: 262 mg/dL (25 @ 12:51)  POCT Blood Glucose.: 237 mg/dL (25 @ 11:24)  POCT Blood Glucose.: 263 mg/dL (25 @ 08:18)  POCT Blood Glucose.: 251 mg/dL (25 @ 23:06)  POCT Blood Glucose.: 273 mg/dL (25 @ 19:51)  POCT Blood Glucose.: 334 mg/dL (25 @ 19:18)  POCT Blood Glucose.: 384 mg/dL (25 @ 17:13)  POCT Blood Glucose.: 370 mg/dL (25 @ 14:05)    TSH 0.48; Total T3 --; Free T4 --   HPI:  65-year-old male PMH diabetes (on insulin) and asthma coming to ED for vomiting, diarrhea, abdominal pain.  Patient reports for the last 2 days has had generalized abdominal pain with multiple episodes of nonbloody nonbilious vomiting, and nonbloody diarrhea.  Patient endorses increased thirst and urinary frequency.  Reports noncompliance with insulin ( takes insulin prn, doesn't remember dose or how often he is supposed to take it) and blood sugar at home has been >400.  Denies full/trauma, fever, chest pain, shortness of breath, dysuria, hematuria.    In the ED,     Vitals: Afebrile, HR 83, BP: 110/73, RR: 18, Satting at 100% RA  Labs: WBC: 3.24 Hb.3 Plt:245, Na:138 K:4.5 Crea:1.3 Anion Gap:16 BUN:23 Glucose 392, Lipase: 90, BHB:204  VBG: pH: 7.39 Base excess: -3.1 pCO2:35 HCO3:21  UA: 40 Ketone, trace blood, >1000 glucose  Imaging:  CXR: Neg for acute processes  CT A/P: Mildly dilated loops of small bowel with fluid. Relative area of   transition with collapsed loops of bowel seen distally. Findings may   reflect small bowel obstruction. Thickened bowel wall may reflect an   enteritis. Air and stool within the colon may reflect an early or partial   small bowel obstruction.    s/p:  1L sodium chloride bolus x2 and 10 units of humalin. Admitted to medicine.     (2025 21:19)      PAST MEDICAL & SURGICAL HISTORY  Diabetes    Asthma    MAYLIN (obstructive sleep apnea)        FAMILY HISTORY:  FAMILY HISTORY:      SOCIAL HISTORY:  []smoker  []Alcohol  []Drug    ALLERGIES:  No Known Allergies      MEDICATIONS:  MEDICATIONS  (STANDING):  albuterol    90 MICROgram(s) HFA Inhaler 1 Puff(s) Inhalation daily  brimonidine 0.2% Ophthalmic Solution 1 Drop(s) Both EYES daily  dextrose 5%. 1000 milliLiter(s) (50 mL/Hr) IV Continuous <Continuous>  dextrose 5%. 1000 milliLiter(s) (100 mL/Hr) IV Continuous <Continuous>  dextrose 50% Injectable 25 Gram(s) IV Push once  dextrose 50% Injectable 12.5 Gram(s) IV Push once  dextrose 50% Injectable 25 Gram(s) IV Push once  dextrose 50% Injectable 25 Gram(s) IV Push once  glucagon  Injectable 1 milliGRAM(s) IntraMuscular once  heparin   Injectable 5000 Unit(s) SubCutaneous every 12 hours  insulin glargine Injectable (LANTUS) 35 Unit(s) SubCutaneous at bedtime  insulin lispro (ADMELOG) corrective regimen sliding scale   SubCutaneous three times a day before meals  insulin lispro Injectable (ADMELOG) 7 Unit(s) SubCutaneous three times a day before meals  timolol 0.5% Solution 1 Drop(s) Both EYES daily    MEDICATIONS  (PRN):  acetaminophen     Tablet .. 650 milliGRAM(s) Oral every 6 hours PRN Temp greater or equal to 38C (100.4F), Mild Pain (1 - 3)  aluminum hydroxide/magnesium hydroxide/simethicone Suspension 30 milliLiter(s) Oral every 4 hours PRN Dyspepsia  dextrose Oral Gel 15 Gram(s) Oral once PRN Blood Glucose LESS THAN 70 milliGRAM(s)/deciliter  melatonin 3 milliGRAM(s) Oral at bedtime PRN Insomnia  ondansetron Injectable 4 milliGRAM(s) IV Push every 8 hours PRN Nausea and/or Vomiting  oxycodone    5 mG/acetaminophen 325 mG 1 Tablet(s) Oral every 6 hours PRN Severe Pain (7 - 10)      HOME MEDICATIONS:  Home Medications:  Albuterol (Eqv-Proventil HFA) 90 mcg/inh inhalation aerosol: 2 puff(s) inhaled once a day (2025 13:04)  Basaglar KwikPen 100 units/mL subcutaneous solution: 25 international unit(s) subcutaneous once a day (at bedtime) (2025 13:04)  Combigan 0.2%-0.5% ophthalmic solution: 1 drop(s) in each affected eye once a day (2025 13:04)  semaglutide 0.25 mg/0.5 mL (0.25 mg dose) subcutaneous solution: 0.5 milligram(s) subcutaneously once a week (2025 13:04)      VITALS:   T(F): 98.1 ( @ 07:57), Max: 99 ( @ 14:04)  HR: 65 ( @ 07:57) (65 - 83)  BP: 133/68 ( @ 07:57) (110/73 - 137/71)  BP(mean): 96 ( @ 21:02) (96 - 96)  RR: 18 ( @ 07:57) (18 - 18)  SpO2: 95% ( @ 07:57) (95% - 100%)    I&O's Summary      REVIEW OF SYSTEMS:  as in HPI     PHYSICAL EXAM:  GENERAL: Patient is awake , alert and oriented,  not in acute distress  EYES: No proptosis, no lid lag  NECK: No thyroid enlargement  LUNGS: Clear to auscultation bilaterally   CARDIOVASCULAR: S1/S2 present, RRR , no murmurs or rubs  ABD: Soft, non-tender, non-distended, +BS  EXT: No IKER      LABS:                        13.5   3.57  )-----------( 200      ( 2025 07:28 )             39.3         142  |  107  |  17  ----------------------------<  215[H]  4.4   |  22  |  1.0    Ca    8.7      2025 07:28  Mg     2.5         TPro  6.5  /  Alb  3.8  /  TBili  0.4  /  DBili  x   /  AST  21  /  ALT  22  /  AlkPhos  64                POCT Blood Glucose.: 262 mg/dL (25 @ 12:51)  POCT Blood Glucose.: 237 mg/dL (25 @ 11:24)  POCT Blood Glucose.: 263 mg/dL (25 @ 08:18)  POCT Blood Glucose.: 251 mg/dL (25 @ 23:06)  POCT Blood Glucose.: 273 mg/dL (25 @ 19:51)  POCT Blood Glucose.: 334 mg/dL (25 @ 19:18)  POCT Blood Glucose.: 384 mg/dL (25 @ 17:13)  POCT Blood Glucose.: 370 mg/dL (25 @ 14:05)    TSH 0.48; Total T3 --; Free T4 --

## 2025-06-07 ENCOUNTER — TRANSCRIPTION ENCOUNTER (OUTPATIENT)
Age: 65
End: 2025-06-07

## 2025-06-07 LAB
ANION GAP SERPL CALC-SCNC: 13 MMOL/L — SIGNIFICANT CHANGE UP (ref 7–14)
BUN SERPL-MCNC: 11 MG/DL — SIGNIFICANT CHANGE UP (ref 10–20)
CALCIUM SERPL-MCNC: 8.4 MG/DL — SIGNIFICANT CHANGE UP (ref 8.4–10.5)
CHLORIDE SERPL-SCNC: 104 MMOL/L — SIGNIFICANT CHANGE UP (ref 98–110)
CO2 SERPL-SCNC: 21 MMOL/L — SIGNIFICANT CHANGE UP (ref 17–32)
CREAT SERPL-MCNC: 1 MG/DL — SIGNIFICANT CHANGE UP (ref 0.7–1.5)
EGFR: 85 ML/MIN/1.73M2 — SIGNIFICANT CHANGE UP
EGFR: 85 ML/MIN/1.73M2 — SIGNIFICANT CHANGE UP
GLUCOSE BLDC GLUCOMTR-MCNC: 108 MG/DL — HIGH (ref 70–99)
GLUCOSE BLDC GLUCOMTR-MCNC: 116 MG/DL — HIGH (ref 70–99)
GLUCOSE BLDC GLUCOMTR-MCNC: 196 MG/DL — HIGH (ref 70–99)
GLUCOSE BLDC GLUCOMTR-MCNC: 218 MG/DL — HIGH (ref 70–99)
GLUCOSE BLDC GLUCOMTR-MCNC: 223 MG/DL — HIGH (ref 70–99)
GLUCOSE SERPL-MCNC: 193 MG/DL — HIGH (ref 70–99)
HCT VFR BLD CALC: 41 % — LOW (ref 42–52)
HGB BLD-MCNC: 14 G/DL — SIGNIFICANT CHANGE UP (ref 14–18)
MCHC RBC-ENTMCNC: 28.1 PG — SIGNIFICANT CHANGE UP (ref 27–31)
MCHC RBC-ENTMCNC: 34.1 G/DL — SIGNIFICANT CHANGE UP (ref 32–37)
MCV RBC AUTO: 82.3 FL — SIGNIFICANT CHANGE UP (ref 80–94)
NRBC BLD AUTO-RTO: 0 /100 WBCS — SIGNIFICANT CHANGE UP (ref 0–0)
PLATELET # BLD AUTO: 219 K/UL — SIGNIFICANT CHANGE UP (ref 130–400)
PMV BLD: 10.9 FL — HIGH (ref 7.4–10.4)
POTASSIUM SERPL-MCNC: 4 MMOL/L — SIGNIFICANT CHANGE UP (ref 3.5–5)
POTASSIUM SERPL-SCNC: 4 MMOL/L — SIGNIFICANT CHANGE UP (ref 3.5–5)
RBC # BLD: 4.98 M/UL — SIGNIFICANT CHANGE UP (ref 4.7–6.1)
RBC # FLD: 12.4 % — SIGNIFICANT CHANGE UP (ref 11.5–14.5)
SODIUM SERPL-SCNC: 138 MMOL/L — SIGNIFICANT CHANGE UP (ref 135–146)
WBC # BLD: 4.05 K/UL — LOW (ref 4.8–10.8)
WBC # FLD AUTO: 4.05 K/UL — LOW (ref 4.8–10.8)

## 2025-06-07 PROCEDURE — 70487 CT MAXILLOFACIAL W/DYE: CPT | Mod: 26

## 2025-06-07 PROCEDURE — 99239 HOSP IP/OBS DSCHRG MGMT >30: CPT

## 2025-06-07 RX ORDER — AMPICILLIN SODIUM AND SULBACTAM SODIUM 1; .5 G/1; G/1
3 INJECTION, POWDER, FOR SOLUTION INTRAMUSCULAR; INTRAVENOUS ONCE
Refills: 0 | Status: COMPLETED | OUTPATIENT
Start: 2025-06-07 | End: 2025-06-07

## 2025-06-07 RX ORDER — INSULIN GLARGINE-YFGN 100 [IU]/ML
40 INJECTION, SOLUTION SUBCUTANEOUS AT BEDTIME
Refills: 0 | Status: DISCONTINUED | OUTPATIENT
Start: 2025-06-07 | End: 2025-06-08

## 2025-06-07 RX ORDER — AMPICILLIN SODIUM AND SULBACTAM SODIUM 1; .5 G/1; G/1
INJECTION, POWDER, FOR SOLUTION INTRAMUSCULAR; INTRAVENOUS
Refills: 0 | Status: DISCONTINUED | OUTPATIENT
Start: 2025-06-07 | End: 2025-06-09

## 2025-06-07 RX ORDER — SEMAGLUTIDE 1 MG/.5ML
0.5 INJECTION, SOLUTION SUBCUTANEOUS
Refills: 0 | DISCHARGE

## 2025-06-07 RX ORDER — AMPICILLIN SODIUM AND SULBACTAM SODIUM 1; .5 G/1; G/1
3 INJECTION, POWDER, FOR SOLUTION INTRAMUSCULAR; INTRAVENOUS EVERY 6 HOURS
Refills: 0 | Status: DISCONTINUED | OUTPATIENT
Start: 2025-06-07 | End: 2025-06-09

## 2025-06-07 RX ADMIN — INSULIN LISPRO 4: 100 INJECTION, SOLUTION INTRAVENOUS; SUBCUTANEOUS at 08:42

## 2025-06-07 RX ADMIN — AMPICILLIN SODIUM AND SULBACTAM SODIUM 200 GRAM(S): 1; .5 INJECTION, POWDER, FOR SOLUTION INTRAMUSCULAR; INTRAVENOUS at 23:46

## 2025-06-07 RX ADMIN — INSULIN LISPRO 15 UNIT(S): 100 INJECTION, SOLUTION INTRAVENOUS; SUBCUTANEOUS at 17:26

## 2025-06-07 RX ADMIN — AMPICILLIN SODIUM AND SULBACTAM SODIUM 200 GRAM(S): 1; .5 INJECTION, POWDER, FOR SOLUTION INTRAMUSCULAR; INTRAVENOUS at 12:25

## 2025-06-07 RX ADMIN — BRIMONIDINE TARTRATE 1 DROP(S): 1.5 SOLUTION/ DROPS OPHTHALMIC at 12:27

## 2025-06-07 RX ADMIN — AMPICILLIN SODIUM AND SULBACTAM SODIUM 200 GRAM(S): 1; .5 INJECTION, POWDER, FOR SOLUTION INTRAMUSCULAR; INTRAVENOUS at 18:04

## 2025-06-07 RX ADMIN — INSULIN GLARGINE-YFGN 40 UNIT(S): 100 INJECTION, SOLUTION SUBCUTANEOUS at 22:16

## 2025-06-07 RX ADMIN — INSULIN LISPRO 15 UNIT(S): 100 INJECTION, SOLUTION INTRAVENOUS; SUBCUTANEOUS at 13:18

## 2025-06-07 RX ADMIN — TIMOLOL MALEATE 1 DROP(S): 6.8 SOLUTION OPHTHALMIC at 12:27

## 2025-06-07 RX ADMIN — Medication 1 PUFF(S): at 09:10

## 2025-06-07 RX ADMIN — INSULIN LISPRO 15 UNIT(S): 100 INJECTION, SOLUTION INTRAVENOUS; SUBCUTANEOUS at 08:43

## 2025-06-07 RX ADMIN — HEPARIN SODIUM 5000 UNIT(S): 1000 INJECTION INTRAVENOUS; SUBCUTANEOUS at 06:02

## 2025-06-07 RX ADMIN — HEPARIN SODIUM 5000 UNIT(S): 1000 INJECTION INTRAVENOUS; SUBCUTANEOUS at 17:25

## 2025-06-07 NOTE — DISCHARGE NOTE PROVIDER - HOSPITAL COURSE
65-year-old male PMH diabetes (on insulin) and asthma coming to ED for vomiting, diarrhea, abdominal pain.  Patient reports for the last 2 days has had generalized abdominal pain with multiple episodes of nonbloody nonbilious vomiting, and nonbloody diarrhea.   Pt noted to be hyperglycemic with possible mild DKA. Treated with SC basal-bolus insulin with resolution of sxs. pt was seen by endocrinology and recs made for outpatient endo appt. Pt also had right toothache/swelling. Evaluated by dental team and follow up outpt scheduled. 65-year-old male PMH diabetes (on insulin) and asthma coming to ED for vomiting, diarrhea, abdominal pain.  Patient reports for the last 2 days has had generalized abdominal pain with multiple episodes of nonbloody nonbilious vomiting, and nonbloody diarrhea.   Pt noted to be hyperglycemic with possible mild DKA. Treated with SC basal-bolus insulin with resolution of sxs. pt was seen by endocrinology and recs made for outpatient endo appt. Pt also had right toothache/swelling. Evaluated by dental team s/p I and D and tooth extraction    65-year-old male PMH diabetes (on insulin) and asthma coming to ED for vomiting, diarrhea, abdominal pain.  Patient reports for the last 2 days has had generalized abdominal pain with multiple episodes of nonbloody nonbilious vomiting, and nonbloody diarrhea.   Pt noted to be hyperglycemic with possible mild DKA. Treated with SC basal-bolus insulin with resolution of sxs. pt was seen by endocrinology and recs made for outpatient endo appt. Pt also had right toothache/swelling. Evaluated by dental team s/p I and D and tooth extraction     Follow up with Dental appointment.

## 2025-06-07 NOTE — DISCHARGE NOTE PROVIDER - NSFOLLOWUPCLINICS_GEN_ALL_ED_FT
[facility]  Dental  [address] [place] [id number]  Phone: [phone]  Fax:

## 2025-06-07 NOTE — DISCHARGE NOTE PROVIDER - NSDCHC_MEDRECSTATUS_GEN_ALL_CORE
Admission Reconciliation is Completed  Discharge Reconciliation is Not Complete Admission Reconciliation is Completed  Discharge Reconciliation is Completed Complex Repair And V-Y Plasty Text: The defect edges were debeveled with a #15 scalpel blade.  The primary defect was closed partially with a complex linear closure.  Given the location of the remaining defect, shape of the defect and the proximity to free margins a V-Y plasty was deemed most appropriate for complete closure of the defect.  Using a sterile surgical marker, an appropriate advancement flap was drawn incorporating the defect and placing the expected incisions within the relaxed skin tension lines where possible.    The area thus outlined was incised deep to adipose tissue with a #15 scalpel blade.  The skin margins were undermined to an appropriate distance in all directions utilizing iris scissors.

## 2025-06-07 NOTE — DISCHARGE NOTE PROVIDER - NSDCCPCAREPLAN_GEN_ALL_CORE_FT
PRINCIPAL DISCHARGE DIAGNOSIS  Diagnosis: Hyperglycemia  Assessment and Plan of Treatment: Continue basaglar 35 units at bedtime, and lispro 13 three times a day with meals. Follow up with your pcp within 2 weeks.      SECONDARY DISCHARGE DIAGNOSES  Diagnosis: Abdominal pain  Assessment and Plan of Treatment:     Diagnosis: Pain, dental  Assessment and Plan of Treatment: Take augmentin as prescribed for 7 days. fFollow up in dental clinic as scheduled     PRINCIPAL DISCHARGE DIAGNOSIS  Diagnosis: Hyperglycemia  Assessment and Plan of Treatment: continue basaglar 35 units at bedtime, and lispro 13 three times a day with meals. follow up with your pcp withon 2 weeks.      SECONDARY DISCHARGE DIAGNOSES  Diagnosis: Abdominal pain  Assessment and Plan of Treatment:     Diagnosis: Pain, dental  Assessment and Plan of Treatment: take augmentin as prescribed for 7 days. follow up in dental clinic

## 2025-06-07 NOTE — CONSULT NOTE ADULT - ASSESSMENT
Patient is a 65y old  Male who presents with a chief complaint of hyperglycemia and abd pain (2025 15:48)      HPI:  65-year-old male PMH diabetes (on insulin) and asthma coming to ED for vomiting, diarrhea, abdominal pain.  Patient reports for the last 2 days has had generalized abdominal pain with multiple episodes of nonbloody nonbilious vomiting, and nonbloody diarrhea.  Patient endorses increased thirst and urinary frequency.  Reports noncompliance with insulin ( takes insulin prn, doesn't remember dose or how often he is supposed to take it) and blood sugar at home has been >400.  Denies full/trauma, fever, chest pain, shortness of breath, dysuria, hematuria.    In the ED,     Vitals: Afebrile, HR 83, BP: 110/73, RR: 18, Satting at 100% RA  Labs: WBC: 3.24 Hb.3 Plt:245, Na:138 K:4.5 Crea:1.3 Anion Gap:16 BUN:23 Glucose 392, Lipase: 90, BHB:204  VBG: pH: 7.39 Base excess: -3.1 pCO2:35 HCO3:21  UA: 40 Ketone, trace blood, >1000 glucose  Imaging:  CXR: Neg for acute processes  CT A/P: Mildly dilated loops of small bowel with fluid. Relative area of   transition with collapsed loops of bowel seen distally. Findings may   reflect small bowel obstruction. Thickened bowel wall may reflect an   enteritis. Air and stool within the colon may reflect an early or partial   small bowel obstruction.    s/p:  1L sodium chloride bolus x2 and 10 units of humalin. Admitted to medicine.     (2025 21:19)      PAST MEDICAL & SURGICAL HISTORY:  Diabetes      Asthma      MAYLIN (obstructive sleep apnea)        (   ) heart valve replacement  (   ) joint replacement  (   ) pregnancy    MEDICATIONS  (STANDING):  albuterol    90 MICROgram(s) HFA Inhaler 1 Puff(s) Inhalation daily  ampicillin/sulbactam  IVPB      ampicillin/sulbactam  IVPB 3 Gram(s) IV Intermittent every 6 hours  brimonidine 0.2% Ophthalmic Solution 1 Drop(s) Both EYES daily  dextrose 5%. 1000 milliLiter(s) (50 mL/Hr) IV Continuous <Continuous>  dextrose 5%. 1000 milliLiter(s) (100 mL/Hr) IV Continuous <Continuous>  dextrose 50% Injectable 25 Gram(s) IV Push once  dextrose 50% Injectable 12.5 Gram(s) IV Push once  dextrose 50% Injectable 25 Gram(s) IV Push once  dextrose 50% Injectable 25 Gram(s) IV Push once  glucagon  Injectable 1 milliGRAM(s) IntraMuscular once  heparin   Injectable 5000 Unit(s) SubCutaneous every 12 hours  insulin glargine Injectable (LANTUS) 40 Unit(s) SubCutaneous at bedtime  insulin lispro (ADMELOG) corrective regimen sliding scale   SubCutaneous three times a day before meals  insulin lispro Injectable (ADMELOG) 15 Unit(s) SubCutaneous three times a day before meals  timolol 0.5% Solution 1 Drop(s) Both EYES daily    MEDICATIONS  (PRN):  acetaminophen     Tablet .. 650 milliGRAM(s) Oral every 6 hours PRN Temp greater or equal to 38C (100.4F), Mild Pain (1 - 3)  aluminum hydroxide/magnesium hydroxide/simethicone Suspension 30 milliLiter(s) Oral every 4 hours PRN Dyspepsia  dextrose Oral Gel 15 Gram(s) Oral once PRN Blood Glucose LESS THAN 70 milliGRAM(s)/deciliter  melatonin 3 milliGRAM(s) Oral at bedtime PRN Insomnia  ondansetron Injectable 4 milliGRAM(s) IV Push every 8 hours PRN Nausea and/or Vomiting  oxycodone    5 mG/acetaminophen 325 mG 1 Tablet(s) Oral every 6 hours PRN Severe Pain (7 - 10)  polyethylene glycol 3350 17 Gram(s) Oral two times a day PRN Constipation      Allergies    No Known Allergies    Intolerances        FAMILY HISTORY:      *SOCIAL HISTORY: (   ) Tobacco; (   ) ETOH    *Last Dental Visit:    Vital Signs Last 24 Hrs  T(C): 36.7 (2025 13:36), Max: 36.7 (2025 20:00)  T(F): 98 (2025 13:36), Max: 98 (2025 20:00)  HR: 78 (2025 13:36) (68 - 78)  BP: 121/69 (2025 13:36) (116/77 - 154/68)  BP(mean): 86 (2025 13:36) (86 - 90)  RR: 18 (2025 13:36) (18 - 18)  SpO2: 96% (2025 13:36) (96% - 99%)    Parameters below as of 2025 13:36  Patient On (Oxygen Delivery Method): room air        LABS:                        14.0   4.05  )-----------( 219      ( 2025 06:57 )             41.0         138  |  104  |  11  ----------------------------<  193[H]  4.0   |  21  |  1.0    Ca    8.4      2025 06:57  Mg     2.5         TPro  6.5  /  Alb  3.8  /  TBili  0.4  /  DBili  x   /  AST  21  /  ALT  22  /  AlkPhos  64  06-    WBC Count: 4.05 K/uL *L* [4.80 - 10.80] ( @ 06:57)  Platelet Count - Automated: 219 K/uL [130 - 400] ( @ 06:57)  WBC Count: 3.57 K/uL *L* [4.80 - 10.80] ( @ 07:28)  Platelet Count - Automated: 200 K/uL [130 - 400] ( @ 07:28)  Platelet Count - Automated: 245 K/uL [130 - 400] ( @ 14:34)  WBC Count: 3.24 K/uL *L* [4.80 - 10.80] ( @ 14:34)    Urinalysis Basic - ( 2025 06:57 )    Color: x / Appearance: x / SG: x / pH: x  Gluc: 193 mg/dL / Ketone: x  / Bili: x / Urobili: x   Blood: x / Protein: x / Nitrite: x   Leuk Esterase: x / RBC: x / WBC x   Sq Epi: x / Non Sq Epi: x / Bacteria: x          EOE:  TMJ ( - ) clicks                     ( - ) pops                     ( - ) crepitus             Mandible <<FROM>>             Facial bones and MOM <<grossly intact>>             ( - ) trismus             ( - ) lymphadenopathy             ( + ) swelling             ( + ) asymmetry             ( + ) palpation             ( - ) dyspnea             ( - ) dysphagia             ( - ) loss of consciousness    IOE:  <<permanent>> dentition: <<multiple carious teeth>>           tongue/FOM <<No pathology noted>>           labial/buccal mucosa <<No pathology noted>>           ( + ) percussion           ( + ) palpation           ( + ) swelling            ( + ) abscess           ( - ) sinus tract    Dentition present: <<  yes >>  Mobility: << no >>  Caries: << yes  >>         *DENTAL RADIOGRAPHS: none    RADIOLOGY & ADDITIONAL STUDIES: CT maxillofacial with IV contrast: official read: An odontogenic abscess is noted along the buccal surface of the maxilla arising from the right mandibular first premolar tooth.    Opacified right maxillary sinus consistent with chronic odontogenic sinusitis.    << end of copied text>>    *ASSESSMENT: 65-year-old male PMH diabetes (on insulin) and asthma has upper right sided facial swelling likely resulting from periapical abscess from broken and carious tooth #4.       *PLAN: Bedside incision and drainage, prescribe abx, f/u outpatient with dental.    PROCEDURE:   Verbal and written consent given.  Anesthesia: << 1 carpule3% mepivacaine via infiltration, 2 carpules 2% lidocaine with 1:100k epi via infiltration   >>   Treatment: << Incision and drainage performed using 15 blade followed by blunt dissection of site. irrigated with saline gauze placed and hemostasis achieved.   >>     RECOMMENDATIONS:  1) Prescribe Augmentin 875/125 mg q8h x 7 days, ibuprofen 600 mg prn pain, warm salt water rinses  2) Dental F/U with outpatient dentist for comprehensive dental care.   3) If any difficulty swallowing/breathing, fever occur, or swelling gets worse return to ER.     Didier Feldman, pager #[phone]

## 2025-06-07 NOTE — DISCHARGE NOTE PROVIDER - NSDCMRMEDTOKEN_GEN_ALL_CORE_FT
Albuterol (Eqv-Proventil HFA) 90 mcg/inh inhalation aerosol: 2 puff(s) inhaled once a day  Basaglar KwikPen 100 units/mL subcutaneous solution: 25 international unit(s) subcutaneous once a day (at bedtime)  Combigan 0.2%-0.5% ophthalmic solution: 1 drop(s) in each affected eye once a day   Albuterol (Eqv-Proventil HFA) 90 mcg/inh inhalation aerosol: 2 puff(s) inhaled 4 times a day as needed for  shortness of breath and/or wheezing  amoxicillin-clavulanate 875 mg-125 mg oral tablet: 1 tab(s) orally 2 times a day  Basaglar KwikPen 100 units/mL subcutaneous solution: 35 international unit(s) subcutaneous once a day (at bedtime)  Combigan 0.2%-0.5% ophthalmic solution: 1 drop(s) in each affected eye once a day  Insulin Lispro KwikPen 100 units/mL injectable solution: 13 unit(s) injectable 3 times a day (with meals) MDD: 3   acetaminophen 325 mg oral tablet: 2 tab(s) orally every 6 hours as needed for Temp greater or equal to 38C (100.4F), Mild Pain (1 - 3)  Albuterol (Eqv-Proventil HFA) 90 mcg/inh inhalation aerosol: 2 puff(s) inhaled 4 times a day as needed for  shortness of breath and/or wheezing  amoxicillin-clavulanate 875 mg-125 mg oral tablet: 1 tab(s) orally 2 times a day  Basaglar KwikPen 100 units/mL subcutaneous solution: 35 international unit(s) subcutaneous once a day (at bedtime)  Combigan 0.2%-0.5% ophthalmic solution: 1 drop(s) in each affected eye once a day  ibuprofen 600 mg oral tablet: 1 tab(s) orally every 6 hours as needed for Temp greater or equal to 38C (100.4F), Moderate Pain (4 - 6)  Insulin Lispro KwikPen 100 units/mL injectable solution: 13 unit(s) injectable 3 times a day (with meals) MDD: 3   acetaminophen 325 mg oral tablet: 2 tab(s) orally every 6 hours as needed for Temp greater or equal to 38C (100.4F), Mild Pain (1 - 3)  Albuterol (Eqv-Proventil HFA) 90 mcg/inh inhalation aerosol: 2 puff(s) inhaled 4 times a day as needed for  shortness of breath and/or wheezing  amoxicillin-clavulanate 875 mg-125 mg oral tablet: 1 tab(s) orally 2 times a day  Basaglar KwikPen 100 units/mL subcutaneous solution: 35 international unit(s) subcutaneous once a day (at bedtime)  Combigan 0.2%-0.5% ophthalmic solution: 1 drop(s) in each affected eye once a day  ibuprofen 600 mg oral tablet: 1 tab(s) orally every 6 hours as needed for Temp greater or equal to 38C (100.4F), Moderate Pain (4 - 6)  Insulin Lispro KwikPen 100 units/mL injectable solution: 12 unit(s) injectable 3 times a day (with meals) MDD: 3

## 2025-06-08 LAB
GLUCOSE BLDC GLUCOMTR-MCNC: 125 MG/DL — HIGH (ref 70–99)
GLUCOSE BLDC GLUCOMTR-MCNC: 157 MG/DL — HIGH (ref 70–99)
GLUCOSE BLDC GLUCOMTR-MCNC: 208 MG/DL — HIGH (ref 70–99)
GLUCOSE BLDC GLUCOMTR-MCNC: 77 MG/DL — SIGNIFICANT CHANGE UP (ref 70–99)

## 2025-06-08 PROCEDURE — 99232 SBSQ HOSP IP/OBS MODERATE 35: CPT

## 2025-06-08 RX ORDER — POLYETHYLENE GLYCOL 3350 17 G/17G
17 POWDER, FOR SOLUTION ORAL
Refills: 0 | Status: DISCONTINUED | OUTPATIENT
Start: 2025-06-08 | End: 2025-06-09

## 2025-06-08 RX ORDER — INSULIN GLARGINE-YFGN 100 [IU]/ML
35 INJECTION, SOLUTION SUBCUTANEOUS AT BEDTIME
Refills: 0 | Status: DISCONTINUED | OUTPATIENT
Start: 2025-06-08 | End: 2025-06-09

## 2025-06-08 RX ORDER — AMOXICILLIN AND CLAVULANATE POTASSIUM 500; 125 MG/1; MG/1
1 TABLET, FILM COATED ORAL
Qty: 14 | Refills: 0
Start: 2025-06-08 | End: 2025-06-14

## 2025-06-08 RX ORDER — ALBUTEROL SULFATE 2.5 MG/3ML
2 VIAL, NEBULIZER (ML) INHALATION
Qty: 0 | Refills: 0 | DISCHARGE

## 2025-06-08 RX ORDER — INSULIN GLARGINE-YFGN 100 [IU]/ML
35 INJECTION, SOLUTION SUBCUTANEOUS
Qty: 0 | Refills: 0 | DISCHARGE

## 2025-06-08 RX ORDER — LACTULOSE 10 G/15ML
20 SOLUTION ORAL THREE TIMES A DAY
Refills: 0 | Status: COMPLETED | OUTPATIENT
Start: 2025-06-08 | End: 2025-06-09

## 2025-06-08 RX ORDER — INSULIN LISPRO 100 U/ML
13 INJECTION, SOLUTION INTRAVENOUS; SUBCUTANEOUS
Qty: 3 | Refills: 0
Start: 2025-06-08 | End: 2025-07-07

## 2025-06-08 RX ORDER — IBUPROFEN 200 MG
600 TABLET ORAL EVERY 6 HOURS
Refills: 0 | Status: DISCONTINUED | OUTPATIENT
Start: 2025-06-08 | End: 2025-06-09

## 2025-06-08 RX ORDER — INSULIN LISPRO 100 U/ML
12 INJECTION, SOLUTION INTRAVENOUS; SUBCUTANEOUS
Refills: 0 | Status: DISCONTINUED | OUTPATIENT
Start: 2025-06-08 | End: 2025-06-09

## 2025-06-08 RX ADMIN — LACTULOSE 20 GRAM(S): 10 SOLUTION ORAL at 13:08

## 2025-06-08 RX ADMIN — BRIMONIDINE TARTRATE 1 DROP(S): 1.5 SOLUTION/ DROPS OPHTHALMIC at 11:18

## 2025-06-08 RX ADMIN — TIMOLOL MALEATE 1 DROP(S): 6.8 SOLUTION OPHTHALMIC at 11:18

## 2025-06-08 RX ADMIN — AMPICILLIN SODIUM AND SULBACTAM SODIUM 200 GRAM(S): 1; .5 INJECTION, POWDER, FOR SOLUTION INTRAMUSCULAR; INTRAVENOUS at 17:15

## 2025-06-08 RX ADMIN — INSULIN LISPRO 2: 100 INJECTION, SOLUTION INTRAVENOUS; SUBCUTANEOUS at 08:11

## 2025-06-08 RX ADMIN — INSULIN LISPRO 15 UNIT(S): 100 INJECTION, SOLUTION INTRAVENOUS; SUBCUTANEOUS at 08:11

## 2025-06-08 RX ADMIN — INSULIN LISPRO 12 UNIT(S): 100 INJECTION, SOLUTION INTRAVENOUS; SUBCUTANEOUS at 17:16

## 2025-06-08 RX ADMIN — INSULIN GLARGINE-YFGN 35 UNIT(S): 100 INJECTION, SOLUTION SUBCUTANEOUS at 21:42

## 2025-06-08 RX ADMIN — LACTULOSE 20 GRAM(S): 10 SOLUTION ORAL at 21:12

## 2025-06-08 RX ADMIN — AMPICILLIN SODIUM AND SULBACTAM SODIUM 200 GRAM(S): 1; .5 INJECTION, POWDER, FOR SOLUTION INTRAMUSCULAR; INTRAVENOUS at 05:23

## 2025-06-08 RX ADMIN — HEPARIN SODIUM 5000 UNIT(S): 1000 INJECTION INTRAVENOUS; SUBCUTANEOUS at 17:15

## 2025-06-08 RX ADMIN — LACTULOSE 20 GRAM(S): 10 SOLUTION ORAL at 11:15

## 2025-06-08 RX ADMIN — HEPARIN SODIUM 5000 UNIT(S): 1000 INJECTION INTRAVENOUS; SUBCUTANEOUS at 05:23

## 2025-06-08 RX ADMIN — AMPICILLIN SODIUM AND SULBACTAM SODIUM 200 GRAM(S): 1; .5 INJECTION, POWDER, FOR SOLUTION INTRAMUSCULAR; INTRAVENOUS at 11:16

## 2025-06-08 NOTE — PROGRESS NOTE ADULT - ATTENDING COMMENTS
65-year-old male PMH diabetes (on insulin) coming to ED for vomiting, diarrhea, abdominal pain.  Patient reports for the last 2 days has had generalized abdominal pain with multiple episodes of nonbloody nonbilious vomiting, and nonbloody diarrhea.     #Uncontrolled diabetes  #Nausea/vomiting/diarrhea resolved  #Constipation  CTAP w IV 06/05 reviewed showing mildly dilated loops of small bowel; thickened bowel  FS better controlled  dw surgery team, endocrinology  - Pt having bowel movement, no interventions  - Adjust insulin to lantus/lispro 35-13-13-13  - ISS  - DC ozempic  - Start bowel regimen  - DC planning tomorrow AM, pt has appointment with dental clinic at 7-8AM and would like to leave tomorrow. Also concerned about having no BM.    #Dental pain  #Odontogenic abscess  CT Maxillofacial 06/06: Odontogenic abscess noted along the buccal surface of the maxilla arising from the first mandibular first premolar tooth (1.8 x 0.7cm)  s/p I/D by dental team 06/07  - Acetaminophen prn  - Monitor  - Start IV unasyn 3g q6h   - augmentin 875 bid x7d on discharge    #Asthma  - Home:  proventil qdaily  - Continue proventil    DVT prophylaxis: Heparin sqh    DC Planning

## 2025-06-08 NOTE — PROGRESS NOTE ADULT - REASON FOR ADMISSION
hyperglycemia and abd pain

## 2025-06-08 NOTE — PROGRESS NOTE ADULT - ASSESSMENT
65-year-old male PMH diabetes (on insulin) coming to ED for vomiting, diarrhea, abdominal pain.  Patient reports for the last 2 days has had generalized abdominal pain with multiple episodes of nonbloody nonbilious vomiting, and nonbloody diarrhea.     #Uncontrolled diabetes  #Hyperglycemia  - BHB:2.4 Glucose: 392 Anion gap: 16. No acidosis on VBG  - Start Lantus 25 unit stat  - Start lispro 7u TID w SSI  - F/u endocrinology consult  - F/u hA1c, lipid profile, BHB, and BMP    #Partial Small bowel obstruction noted on CT A/P  #Diarrhea- resolved  # N/V- resolved  - Patient is passing gas, has not had a BM yet  - Surgery consult recs 6/5: No acute surgical intervention at this time     #Asthma  - Home:  proventil qdaily  - Continue proventil    #Tooth pain  - Patient has sharp tooth pain. Missed appt due to being at doctors office  - S/P IV Toradol 15  - Acetaminophen prn  - Fu dental cs      -DVT prophylaxis: Heparin sqh  -GI prophylaxis: None  -Diet: Clear liquid diet  -Code status:  -Activity: IAT  -Dispo: medicine  Pending- BS control,dentaL
65-year-old male PMH diabetes (on insulin) coming to ED for vomiting, diarrhea, abdominal pain.  Patient reports for the last 2 days has had generalized abdominal pain with multiple episodes of nonbloody nonbilious vomiting, and nonbloody diarrhea.     #Uncontrolled diabetes  #Nausea/vomiting/diarrhea resolved  CTAP w IV 06/05 reviewed showing mildly dilated loops of small bowel; thickened bowel  FS better controlled  dw surgery team, endocrinology  - Pt having bowel movement, no interventions  - Increase lantus/lispro to 40-15-15-15  - ISS  - DC ozempic    #Dental pain  #Odontogenic abscess  CT Maxillofacial 06/06: Odontogenic abscess noted along the buccal surface of the maxilla arising from the first mandibular first premolar tooth (1.8 x 0.7cm)  Dental team has evaluated pt prior to CT  - Acetaminophen prn  - Monitor  - Start IV unasyn 3g q6h   - Dental team will fu if pt needs to stay until Monday. Otherwise, possible dc tomorrow on augmentin    #Asthma  - Home:  proventil qdaily  - Continue proventil    DVT prophylaxis: Heparin sqh    #Progress Note Handoff  Pending (specify): Monitor FS, dental fu  Family discussion: dw pt regarding plan of care  Disposition: GMF, from home .
  LIUDMILA AVALOS  65y, Male  Allergy: No Known Allergies    Hospital Day: 3d    Patient seen and examined. No acute events overnight    PMH/PSH:  PAST MEDICAL & SURGICAL HISTORY:  Diabetes      Asthma      MAYLIN (obstructive sleep apnea)          VITALS:  T(F): 98.5 (06-08-25 @ 13:11), Max: 98.5 (06-08-25 @ 13:11)  HR: 74 (06-08-25 @ 13:11)  BP: 99/64 (06-08-25 @ 13:11) (99/64 - 114/70)  RR: 18 (06-08-25 @ 13:11)  SpO2: 98% (06-08-25 @ 13:11)    TESTS & MEASUREMENTS:  Weight (Kg):   BMI (kg/m2): 32.8 (06-05)    06-06-25 @ 07:01  -  06-07-25 @ 07:00  --------------------------------------------------------  IN: 0 mL / OUT: 300 mL / NET: -300 mL                            14.0   4.05  )-----------( 219      ( 07 Jun 2025 06:57 )             41.0       06-07    138  |  104  |  11  ----------------------------<  193[H]  4.0   |  21  |  1.0    Ca    8.4      07 Jun 2025 06:57              Urinalysis with Rflx Culture (collected 06-05-25 @ 16:33)      Urinalysis Basic - ( 07 Jun 2025 06:57 )    Color: x / Appearance: x / SG: x / pH: x  Gluc: 193 mg/dL / Ketone: x  / Bili: x / Urobili: x   Blood: x / Protein: x / Nitrite: x   Leuk Esterase: x / RBC: x / WBC x   Sq Epi: x / Non Sq Epi: x / Bacteria: x        RADIOLOGY & ADDITIONAL TESTS:    RECENT DIAGNOSTIC ORDERS:      MEDICATIONS:  MEDICATIONS  (STANDING):  albuterol    90 MICROgram(s) HFA Inhaler 1 Puff(s) Inhalation daily  ampicillin/sulbactam  IVPB      ampicillin/sulbactam  IVPB 3 Gram(s) IV Intermittent every 6 hours  brimonidine 0.2% Ophthalmic Solution 1 Drop(s) Both EYES daily  dextrose 5%. 1000 milliLiter(s) (50 mL/Hr) IV Continuous <Continuous>  dextrose 5%. 1000 milliLiter(s) (100 mL/Hr) IV Continuous <Continuous>  dextrose 50% Injectable 25 Gram(s) IV Push once  dextrose 50% Injectable 12.5 Gram(s) IV Push once  dextrose 50% Injectable 25 Gram(s) IV Push once  dextrose 50% Injectable 25 Gram(s) IV Push once  glucagon  Injectable 1 milliGRAM(s) IntraMuscular once  heparin   Injectable 5000 Unit(s) SubCutaneous every 12 hours  insulin glargine Injectable (LANTUS) 40 Unit(s) SubCutaneous at bedtime  insulin lispro (ADMELOG) corrective regimen sliding scale   SubCutaneous three times a day before meals  insulin lispro Injectable (ADMELOG) 15 Unit(s) SubCutaneous three times a day before meals  lactulose Syrup 20 Gram(s) Oral three times a day  polyethylene glycol 3350 17 Gram(s) Oral two times a day  timolol 0.5% Solution 1 Drop(s) Both EYES daily    MEDICATIONS  (PRN):  acetaminophen     Tablet .. 650 milliGRAM(s) Oral every 6 hours PRN Temp greater or equal to 38C (100.4F), Mild Pain (1 - 3)  aluminum hydroxide/magnesium hydroxide/simethicone Suspension 30 milliLiter(s) Oral every 4 hours PRN Dyspepsia  dextrose Oral Gel 15 Gram(s) Oral once PRN Blood Glucose LESS THAN 70 milliGRAM(s)/deciliter  ibuprofen  Tablet. 600 milliGRAM(s) Oral every 6 hours PRN Temp greater or equal to 38C (100.4F), Moderate Pain (4 - 6)  melatonin 3 milliGRAM(s) Oral at bedtime PRN Insomnia  ondansetron Injectable 4 milliGRAM(s) IV Push every 8 hours PRN Nausea and/or Vomiting  oxycodone    5 mG/acetaminophen 325 mG 1 Tablet(s) Oral every 6 hours PRN Severe Pain (7 - 10)  polyethylene glycol 3350 17 Gram(s) Oral two times a day PRN Constipation      HOME MEDICATIONS:  Albuterol (Eqv-Proventil HFA) 90 mcg/inh inhalation aerosol (06-06)  Basaglar KwikPen 100 units/mL subcutaneous solution (06-06)  Combigan 0.2%-0.5% ophthalmic solution (06-06)      REVIEW OF SYSTEMS:  All other review of systems is negative unless indicated above.     PHYSICAL EXAM:  PHYSICAL EXAM:  GENERAL: NAD  HEAD:  Atraumatic, Normocephalic  NECK: Supple, No JVD  CHEST/LUNG: Clear to auscultation bilaterally; No wheeze  HEART: Regular rate and rhythm; No murmurs, rubs, or gallops  ABDOMEN: Soft, Nontender, Nondistended; Bowel sounds present  EXTREMITIES:  2+ Peripheral Pulses, No clubbing, cyanosis, or edema  SKIN: No rashes or lesions      
65y M w/ PMHx of DM, glaucoma who presents with abdominal pain. Surgery consulted for r/o obstruction versus ileus.     PLAN:  - Patient's clinical presentation not consistent with obstruction or ileus. Endorses passing gas and having BM, no nausea or vomiting, and denies any abdominal pain. Abdominal exam is completely benign, no tenderness or distention present. Tolerating regular diet.  - No acute surgical intervention  - Please recall surgery PRN  - Rest of care per primary team    SURGERY SPECTRA: [id number]

## 2025-06-08 NOTE — PROGRESS NOTE ADULT - SUBJECTIVE AND OBJECTIVE BOX
LIUDMILA AVALOS  65y, Male  Allergy: No Known Allergies    Hospital Day: 2d    Patient seen and examined. No acute events overnight    PMH/PSH:  PAST MEDICAL & SURGICAL HISTORY:  Diabetes      Asthma      MAYLIN (obstructive sleep apnea)          VITALS:  T(F): 98 (06-07-25 @ 13:36), Max: 98.4 (06-06-25 @ 15:51)  HR: 78 (06-07-25 @ 13:36)  BP: 121/69 (06-07-25 @ 13:36) (116/77 - 154/68)  RR: 18 (06-07-25 @ 13:36)  SpO2: 96% (06-07-25 @ 13:36)    TESTS & MEASUREMENTS:  Weight (Kg):   BMI (kg/m2): 32.8 (06-05)    06-06-25 @ 07:01  -  06-07-25 @ 07:00  --------------------------------------------------------  IN: 0 mL / OUT: 300 mL / NET: -300 mL                            14.0   4.05  )-----------( 219      ( 07 Jun 2025 06:57 )             41.0       06-07    138  |  104  |  11  ----------------------------<  193[H]  4.0   |  21  |  1.0    Ca    8.4      07 Jun 2025 06:57  Mg     2.5     06-06    TPro  6.5  /  Alb  3.8  /  TBili  0.4  /  DBili  x   /  AST  21  /  ALT  22  /  AlkPhos  64  06-06    LIVER FUNCTIONS - ( 06 Jun 2025 07:28 )  Alb: 3.8 g/dL / Pro: 6.5 g/dL / ALK PHOS: 64 U/L / ALT: 22 U/L / AST: 21 U/L / GGT: x                 Urinalysis with Rflx Culture (collected 06-05-25 @ 16:33)      Urinalysis Basic - ( 07 Jun 2025 06:57 )    Color: x / Appearance: x / SG: x / pH: x  Gluc: 193 mg/dL / Ketone: x  / Bili: x / Urobili: x   Blood: x / Protein: x / Nitrite: x   Leuk Esterase: x / RBC: x / WBC x   Sq Epi: x / Non Sq Epi: x / Bacteria: x        RADIOLOGY & ADDITIONAL TESTS:    RECENT DIAGNOSTIC ORDERS:      MEDICATIONS:  MEDICATIONS  (STANDING):  albuterol    90 MICROgram(s) HFA Inhaler 1 Puff(s) Inhalation daily  ampicillin/sulbactam  IVPB      ampicillin/sulbactam  IVPB 3 Gram(s) IV Intermittent every 6 hours  brimonidine 0.2% Ophthalmic Solution 1 Drop(s) Both EYES daily  dextrose 5%. 1000 milliLiter(s) (50 mL/Hr) IV Continuous <Continuous>  dextrose 5%. 1000 milliLiter(s) (100 mL/Hr) IV Continuous <Continuous>  dextrose 50% Injectable 25 Gram(s) IV Push once  dextrose 50% Injectable 12.5 Gram(s) IV Push once  dextrose 50% Injectable 25 Gram(s) IV Push once  dextrose 50% Injectable 25 Gram(s) IV Push once  glucagon  Injectable 1 milliGRAM(s) IntraMuscular once  heparin   Injectable 5000 Unit(s) SubCutaneous every 12 hours  insulin glargine Injectable (LANTUS) 40 Unit(s) SubCutaneous at bedtime  insulin lispro (ADMELOG) corrective regimen sliding scale   SubCutaneous three times a day before meals  insulin lispro Injectable (ADMELOG) 15 Unit(s) SubCutaneous three times a day before meals  timolol 0.5% Solution 1 Drop(s) Both EYES daily    MEDICATIONS  (PRN):  acetaminophen     Tablet .. 650 milliGRAM(s) Oral every 6 hours PRN Temp greater or equal to 38C (100.4F), Mild Pain (1 - 3)  aluminum hydroxide/magnesium hydroxide/simethicone Suspension 30 milliLiter(s) Oral every 4 hours PRN Dyspepsia  dextrose Oral Gel 15 Gram(s) Oral once PRN Blood Glucose LESS THAN 70 milliGRAM(s)/deciliter  melatonin 3 milliGRAM(s) Oral at bedtime PRN Insomnia  ondansetron Injectable 4 milliGRAM(s) IV Push every 8 hours PRN Nausea and/or Vomiting  oxycodone    5 mG/acetaminophen 325 mG 1 Tablet(s) Oral every 6 hours PRN Severe Pain (7 - 10)  polyethylene glycol 3350 17 Gram(s) Oral two times a day PRN Constipation      HOME MEDICATIONS:  Albuterol (Eqv-Proventil HFA) 90 mcg/inh inhalation aerosol (06-06)  Basaglar KwikPen 100 units/mL subcutaneous solution (06-06)  Combigan 0.2%-0.5% ophthalmic solution (06-06)      REVIEW OF SYSTEMS:  All other review of systems is negative unless indicated above.     PHYSICAL EXAM:  PHYSICAL EXAM:  GENERAL: NAD  HEAD:  Atraumatic, Normocephalic  NECK: Supple, No JVD  CHEST/LUNG: Clear to auscultation bilaterally; No wheeze  HEART: Regular rate and rhythm; No murmurs, rubs, or gallops  ABDOMEN: Soft, Nontender, Nondistended; Bowel sounds present  EXTREMITIES:  2+ Peripheral Pulses, No clubbing, cyanosis, or edema  SKIN: No rashes or lesions      
GENERAL SURGERY PROGRESS NOTE    Patient: LIUDMILA AVALOS , 65y ([birth year])Male   MRN: [id number]  Location: [facility] ED Hold 011 A  Visit: 06-05-25 Inpatient  Date: 06-06-25 @ 10:20    Hospital Day #: 2    Procedure/Dx/Injuries: r/o obstruction vs ileus    Events of past 24 hours: no acute events, +gas, +BM    PAST MEDICAL & SURGICAL HISTORY:  Diabetes  Asthma  MAYLIN (obstructive sleep apnea)    Vitals:   T(F): 98.1 (06-06-25 @ 07:57), Max: 99 (06-05-25 @ 14:04)  HR: 65 (06-06-25 @ 07:57)  BP: 133/68 (06-06-25 @ 07:57)  RR: 18 (06-06-25 @ 07:57)  SpO2: 95% (06-06-25 @ 07:57)    Diet, DASH/TLC:   Sodium & Cholesterol Restricted  Consistent Carbohydrate No Snacks (CSTCHO)    PHYSICAL EXAM:  General: NAD, AAOx3  HEENT: NCAT, EOMI, Trachea ML  Cardiac: RRR   Respiratory: Chest rise equal bilaterally, no labored breathing  Abdomen: Soft, non-distended, non-tender, no rebound, no guarding  Extremities: ROJAS, well perfused  Skin: Warm/dry, normal color, no jaundice    MEDICATIONS  (STANDING):  albuterol    90 MICROgram(s) HFA Inhaler 1 Puff(s) Inhalation daily  dextrose 5%. 1000 milliLiter(s) (50 mL/Hr) IV Continuous <Continuous>  dextrose 5%. 1000 milliLiter(s) (100 mL/Hr) IV Continuous <Continuous>  dextrose 50% Injectable 25 Gram(s) IV Push once  dextrose 50% Injectable 12.5 Gram(s) IV Push once  dextrose 50% Injectable 25 Gram(s) IV Push once  dextrose 50% Injectable 25 Gram(s) IV Push once  glucagon  Injectable 1 milliGRAM(s) IntraMuscular once  heparin   Injectable 5000 Unit(s) SubCutaneous every 12 hours  insulin lispro (ADMELOG) corrective regimen sliding scale   SubCutaneous three times a day before meals  insulin lispro Injectable (ADMELOG) 7 Unit(s) SubCutaneous three times a day before meals    MEDICATIONS  (PRN):  acetaminophen     Tablet .. 650 milliGRAM(s) Oral every 6 hours PRN Temp greater or equal to 38C (100.4F), Mild Pain (1 - 3)  aluminum hydroxide/magnesium hydroxide/simethicone Suspension 30 milliLiter(s) Oral every 4 hours PRN Dyspepsia  dextrose Oral Gel 15 Gram(s) Oral once PRN Blood Glucose LESS THAN 70 milliGRAM(s)/deciliter  melatonin 3 milliGRAM(s) Oral at bedtime PRN Insomnia  ondansetron Injectable 4 milliGRAM(s) IV Push every 8 hours PRN Nausea and/or Vomiting    DVT PROPHYLAXIS: heparin   Injectable 5000 Unit(s) SubCutaneous every 12 hours    LAB/STUDIES:                        13.5   3.57  )-----------( 200      ( 06 Jun 2025 07:28 )             39.3       Auto Immature Granulocyte %: 0.3 % (06-06-25 @ 07:28)  Auto Immature Granulocyte %: 0.3 % (06-05-25 @ 14:34)    06-06    142  |  107  |  17  ----------------------------<  215[H]  4.4   |  22  |  1.0      Calcium: 8.7 mg/dL (06-06-25 @ 07:28)    LFTs:             6.5  | 0.4  | 21       ------------------[64      ( 06 Jun 2025 07:28 )  3.8  | x    | 22           Blood Gas Venous - Lactate: 2.0 mmol/L (06-05-25 @ 14:36)    Urinalysis Basic - ( 06 Jun 2025 07:28 )    Color: x / Appearance: x / SG: x / pH: x  Gluc: 215 mg/dL / Ketone: x  / Bili: x / Urobili: x   Blood: x / Protein: x / Nitrite: x   Leuk Esterase: x / RBC: x / WBC x   Sq Epi: x / Non Sq Epi: x / Bacteria: x    Urinalysis with Rflx Culture (collected 05 Jun 2025 16:33)  
SUBJECTIVE/OVERNIGHT EVENTS  Today is hospital day 1d. This morning patient was seen and examined at bedside, resting comfortably in bed. No acute or major events overnight.    HPI:  65-year-old male PMH diabetes (on insulin) and asthma coming to ED for vomiting, diarrhea, abdominal pain.  Patient reports for the last 2 days has had generalized abdominal pain with multiple episodes of nonbloody nonbilious vomiting, and nonbloody diarrhea.  Patient endorses increased thirst and urinary frequency.  Reports noncompliance with insulin ( takes insulin prn, doesn't remember dose or how often he is supposed to take it) and blood sugar at home has been >400.  Denies full/trauma, fever, chest pain, shortness of breath, dysuria, hematuria.    In the ED,     Vitals: Afebrile, HR 83, BP: 110/73, RR: 18, Satting at 100% RA  Labs: WBC: 3.24 Hb.3 Plt:245, Na:138 K:4.5 Crea:1.3 Anion Gap:16 BUN:23 Glucose 392, Lipase: 90, BHB:204  VBG: pH: 7.39 Base excess: -3.1 pCO2:35 HCO3:21  UA: 40 Ketone, trace blood, >1000 glucose  Imaging:  CXR: Neg for acute processes  CT A/P: Mildly dilated loops of small bowel with fluid. Relative area of   transition with collapsed loops of bowel seen distally. Findings may   reflect small bowel obstruction. Thickened bowel wall may reflect an   enteritis. Air and stool within the colon may reflect an early or partial   small bowel obstruction.    s/p:  1L sodium chloride bolus x2 and 10 units of humalin. Admitted to medicine.     (2025 21:19)      MEDICATIONS  STANDING MEDICATIONS  albuterol    90 MICROgram(s) HFA Inhaler 1 Puff(s) Inhalation daily  dextrose 5%. 1000 milliLiter(s) IV Continuous <Continuous>  dextrose 5%. 1000 milliLiter(s) IV Continuous <Continuous>  dextrose 50% Injectable 25 Gram(s) IV Push once  dextrose 50% Injectable 12.5 Gram(s) IV Push once  dextrose 50% Injectable 25 Gram(s) IV Push once  dextrose 50% Injectable 25 Gram(s) IV Push once  glucagon  Injectable 1 milliGRAM(s) IntraMuscular once  heparin   Injectable 5000 Unit(s) SubCutaneous every 12 hours  insulin lispro (ADMELOG) corrective regimen sliding scale   SubCutaneous three times a day before meals  insulin lispro Injectable (ADMELOG) 7 Unit(s) SubCutaneous three times a day before meals    PRN MEDICATIONS  acetaminophen     Tablet .. 650 milliGRAM(s) Oral every 6 hours PRN  aluminum hydroxide/magnesium hydroxide/simethicone Suspension 30 milliLiter(s) Oral every 4 hours PRN  dextrose Oral Gel 15 Gram(s) Oral once PRN  melatonin 3 milliGRAM(s) Oral at bedtime PRN  ondansetron Injectable 4 milliGRAM(s) IV Push every 8 hours PRN    VITALS  T(F): 98.1 (25 @ 07:57), Max: 99 (25 @ 14:04)  HR: 65 (25 @ 07:57) (65 - 83)  BP: 133/68 (25 @ 07:57) (110/73 - 137/71)  RR: 18 (25 @ 07:57) (18 - 18)  SpO2: 95% (25 @ 07:57) (95% - 100%)  POCT Blood Glucose.: 263 mg/dL (25 @ 08:18)  POCT Blood Glucose.: 251 mg/dL (25 @ 23:06)  POCT Blood Glucose.: 273 mg/dL (25 @ 19:51)  POCT Blood Glucose.: 334 mg/dL (25 @ 19:18)  POCT Blood Glucose.: 384 mg/dL (25 @ 17:13)  POCT Blood Glucose.: 370 mg/dL (25 @ 14:05)          PHYSICAL EXAM      GENERAL: No acute distress, well-developed  HEAD: R facial swelling  CHEST/LUNG: Clear to auscultation bilaterally; No wheeze, equal breath sounds bilaterally, respirations nonlabored  HEART: Regular rate and rhythm; No murmurs, rubs, or gallops  ABDOMEN: Soft, nontender, nondistended; Bowel sounds present, no organomegaly  NEUROLOGY: AAOx3, non-focal, moves all extremities spontaneously          PAST MEDICAL & SURGICAL HISTORY:  Diabetes      Asthma      MAYLIN (obstructive sleep apnea)            LABS             13.5   3.57  )-----------( 200      ( 25 @ 07:28 )             39.3     142  |  107  |  17  -------------------------<  215   25 @ 07:28  4.4  |  22  |  1.0    Ca      8.7     25 @ 07:28  Mg     2.5     25 @ 07:28    TPro  6.5  /  Alb  3.8  /  TBili  0.4  /  DBili  x   /  AST  21  /  ALT  22  /  AlkPhos  64  /  GGT  x     25 @ 07:28        Urinalysis Basic - ( 2025 07:28 )    Color: x / Appearance: x / SG: x / pH: x  Gluc: 215 mg/dL / Ketone: x  / Bili: x / Urobili: x   Blood: x / Protein: x / Nitrite: x   Leuk Esterase: x / RBC: x / WBC x   Sq Epi: x / Non Sq Epi: x / Bacteria: x          Urinalysis with Rflx Culture (collected 2025 16:33)      IMAGING
  LIUDMILA AVALOS  65y, Male  Allergy: No Known Allergies    Hospital Day: 3d    Patient seen and examined. No acute events overnight    PMH/PSH:  PAST MEDICAL & SURGICAL HISTORY:  Diabetes      Asthma      MAYLIN (obstructive sleep apnea)          VITALS:  T(F): 98.5 (06-08-25 @ 13:11), Max: 98.5 (06-08-25 @ 13:11)  HR: 74 (06-08-25 @ 13:11)  BP: 99/64 (06-08-25 @ 13:11) (99/64 - 114/70)  RR: 18 (06-08-25 @ 13:11)  SpO2: 98% (06-08-25 @ 13:11)    TESTS & MEASUREMENTS:  Weight (Kg):   BMI (kg/m2): 32.8 (06-05)    06-06-25 @ 07:01  -  06-07-25 @ 07:00  --------------------------------------------------------  IN: 0 mL / OUT: 300 mL / NET: -300 mL                            14.0   4.05  )-----------( 219      ( 07 Jun 2025 06:57 )             41.0       06-07    138  |  104  |  11  ----------------------------<  193[H]  4.0   |  21  |  1.0    Ca    8.4      07 Jun 2025 06:57              Urinalysis with Rflx Culture (collected 06-05-25 @ 16:33)      Urinalysis Basic - ( 07 Jun 2025 06:57 )    Color: x / Appearance: x / SG: x / pH: x  Gluc: 193 mg/dL / Ketone: x  / Bili: x / Urobili: x   Blood: x / Protein: x / Nitrite: x   Leuk Esterase: x / RBC: x / WBC x   Sq Epi: x / Non Sq Epi: x / Bacteria: x        RADIOLOGY & ADDITIONAL TESTS:    RECENT DIAGNOSTIC ORDERS:      MEDICATIONS:  MEDICATIONS  (STANDING):  albuterol    90 MICROgram(s) HFA Inhaler 1 Puff(s) Inhalation daily  ampicillin/sulbactam  IVPB      ampicillin/sulbactam  IVPB 3 Gram(s) IV Intermittent every 6 hours  brimonidine 0.2% Ophthalmic Solution 1 Drop(s) Both EYES daily  dextrose 5%. 1000 milliLiter(s) (50 mL/Hr) IV Continuous <Continuous>  dextrose 5%. 1000 milliLiter(s) (100 mL/Hr) IV Continuous <Continuous>  dextrose 50% Injectable 25 Gram(s) IV Push once  dextrose 50% Injectable 12.5 Gram(s) IV Push once  dextrose 50% Injectable 25 Gram(s) IV Push once  dextrose 50% Injectable 25 Gram(s) IV Push once  glucagon  Injectable 1 milliGRAM(s) IntraMuscular once  heparin   Injectable 5000 Unit(s) SubCutaneous every 12 hours  insulin glargine Injectable (LANTUS) 40 Unit(s) SubCutaneous at bedtime  insulin lispro (ADMELOG) corrective regimen sliding scale   SubCutaneous three times a day before meals  insulin lispro Injectable (ADMELOG) 15 Unit(s) SubCutaneous three times a day before meals  lactulose Syrup 20 Gram(s) Oral three times a day  polyethylene glycol 3350 17 Gram(s) Oral two times a day  timolol 0.5% Solution 1 Drop(s) Both EYES daily    MEDICATIONS  (PRN):  acetaminophen     Tablet .. 650 milliGRAM(s) Oral every 6 hours PRN Temp greater or equal to 38C (100.4F), Mild Pain (1 - 3)  aluminum hydroxide/magnesium hydroxide/simethicone Suspension 30 milliLiter(s) Oral every 4 hours PRN Dyspepsia  dextrose Oral Gel 15 Gram(s) Oral once PRN Blood Glucose LESS THAN 70 milliGRAM(s)/deciliter  ibuprofen  Tablet. 600 milliGRAM(s) Oral every 6 hours PRN Temp greater or equal to 38C (100.4F), Moderate Pain (4 - 6)  melatonin 3 milliGRAM(s) Oral at bedtime PRN Insomnia  ondansetron Injectable 4 milliGRAM(s) IV Push every 8 hours PRN Nausea and/or Vomiting  oxycodone    5 mG/acetaminophen 325 mG 1 Tablet(s) Oral every 6 hours PRN Severe Pain (7 - 10)  polyethylene glycol 3350 17 Gram(s) Oral two times a day PRN Constipation      HOME MEDICATIONS:  Albuterol (Eqv-Proventil HFA) 90 mcg/inh inhalation aerosol (06-06)  Basaglar KwikPen 100 units/mL subcutaneous solution (06-06)  Combigan 0.2%-0.5% ophthalmic solution (06-06)      REVIEW OF SYSTEMS:  All other review of systems is negative unless indicated above.     PHYSICAL EXAM:  PHYSICAL EXAM:  GENERAL: NAD  HEAD:  Atraumatic, Normocephalic  NECK: Supple, No JVD  CHEST/LUNG: Clear to auscultation bilaterally; No wheeze  HEART: Regular rate and rhythm; No murmurs, rubs, or gallops  ABDOMEN: Soft, Nontender, Nondistended; Bowel sounds present  EXTREMITIES:  2+ Peripheral Pulses, No clubbing, cyanosis, or edema  SKIN: No rashes or lesions

## 2025-06-09 ENCOUNTER — TRANSCRIPTION ENCOUNTER (OUTPATIENT)
Age: 65
End: 2025-06-09

## 2025-06-09 VITALS
SYSTOLIC BLOOD PRESSURE: 130 MMHG | TEMPERATURE: 99 F | OXYGEN SATURATION: 100 % | RESPIRATION RATE: 18 BRPM | HEART RATE: 64 BPM | DIASTOLIC BLOOD PRESSURE: 69 MMHG

## 2025-06-09 LAB
GLUCOSE BLDC GLUCOMTR-MCNC: 108 MG/DL — HIGH (ref 70–99)
GLUCOSE BLDC GLUCOMTR-MCNC: 120 MG/DL — HIGH (ref 70–99)
GLUCOSE BLDC GLUCOMTR-MCNC: 155 MG/DL — HIGH (ref 70–99)

## 2025-06-09 PROCEDURE — 99239 HOSP IP/OBS DSCHRG MGMT >30: CPT

## 2025-06-09 RX ORDER — IBUPROFEN 200 MG
1 TABLET ORAL
Qty: 28 | Refills: 0
Start: 2025-06-09 | End: 2025-06-15

## 2025-06-09 RX ORDER — IBUPROFEN 200 MG
1 TABLET ORAL
Qty: 20 | Refills: 0
Start: 2025-06-09 | End: 2025-06-13

## 2025-06-09 RX ORDER — ACETAMINOPHEN 500 MG/5ML
2 LIQUID (ML) ORAL
Qty: 56 | Refills: 0
Start: 2025-06-09 | End: 2025-06-15

## 2025-06-09 RX ORDER — ACETAMINOPHEN 500 MG/5ML
2 LIQUID (ML) ORAL
Qty: 40 | Refills: 0
Start: 2025-06-09 | End: 2025-06-13

## 2025-06-09 RX ORDER — INSULIN LISPRO 100 U/ML
12 INJECTION, SOLUTION INTRAVENOUS; SUBCUTANEOUS
Qty: 3 | Refills: 0
Start: 2025-06-09 | End: 2025-07-08

## 2025-06-09 RX ADMIN — AMPICILLIN SODIUM AND SULBACTAM SODIUM 200 GRAM(S): 1; .5 INJECTION, POWDER, FOR SOLUTION INTRAMUSCULAR; INTRAVENOUS at 00:10

## 2025-06-09 RX ADMIN — HEPARIN SODIUM 5000 UNIT(S): 1000 INJECTION INTRAVENOUS; SUBCUTANEOUS at 05:28

## 2025-06-09 RX ADMIN — AMPICILLIN SODIUM AND SULBACTAM SODIUM 200 GRAM(S): 1; .5 INJECTION, POWDER, FOR SOLUTION INTRAMUSCULAR; INTRAVENOUS at 12:32

## 2025-06-09 RX ADMIN — INSULIN LISPRO 2: 100 INJECTION, SOLUTION INTRAVENOUS; SUBCUTANEOUS at 12:32

## 2025-06-09 RX ADMIN — INSULIN LISPRO 12 UNIT(S): 100 INJECTION, SOLUTION INTRAVENOUS; SUBCUTANEOUS at 08:31

## 2025-06-09 RX ADMIN — Medication 1 PUFF(S): at 09:27

## 2025-06-09 RX ADMIN — AMPICILLIN SODIUM AND SULBACTAM SODIUM 200 GRAM(S): 1; .5 INJECTION, POWDER, FOR SOLUTION INTRAMUSCULAR; INTRAVENOUS at 05:27

## 2025-06-09 RX ADMIN — BRIMONIDINE TARTRATE 1 DROP(S): 1.5 SOLUTION/ DROPS OPHTHALMIC at 12:53

## 2025-06-09 RX ADMIN — INSULIN LISPRO 12 UNIT(S): 100 INJECTION, SOLUTION INTRAVENOUS; SUBCUTANEOUS at 18:14

## 2025-06-09 RX ADMIN — INSULIN LISPRO 12 UNIT(S): 100 INJECTION, SOLUTION INTRAVENOUS; SUBCUTANEOUS at 12:32

## 2025-06-09 RX ADMIN — Medication 600 MILLIGRAM(S): at 12:31

## 2025-06-09 RX ADMIN — TIMOLOL MALEATE 1 DROP(S): 6.8 SOLUTION OPHTHALMIC at 12:53

## 2025-06-09 RX ADMIN — AMPICILLIN SODIUM AND SULBACTAM SODIUM 200 GRAM(S): 1; .5 INJECTION, POWDER, FOR SOLUTION INTRAMUSCULAR; INTRAVENOUS at 17:28

## 2025-06-09 RX ADMIN — POLYETHYLENE GLYCOL 3350 17 GRAM(S): 17 POWDER, FOR SOLUTION ORAL at 05:28

## 2025-06-09 RX ADMIN — LACTULOSE 20 GRAM(S): 10 SOLUTION ORAL at 05:28

## 2025-06-09 NOTE — DISCHARGE NOTE NURSING/CASE MANAGEMENT/SOCIAL WORK - FINANCIAL ASSISTANCE
[facility] provides services at a reduced cost to those who are determined to be eligible through [facility]’s financial assistance program. Information regarding [facility]’s financial assistance program can be found by going to [email or web address] or by calling [phone].

## 2025-06-09 NOTE — CONSULT NOTE ADULT - CONSULT REQUESTED DATE/TIME
05-Jun-2025 20:02
LOV 6/3/2022    Future Appointments   Date Time Provider Andrea Daigle   11/4/2022  8:50 AM Jenna Zhang MD [facility]
09-Jun-2025 11:38
07-Jun-2025 17:07
06-Jun-2025 20:37
06-Jun-2025 14:31

## 2025-06-09 NOTE — CONSULT NOTE ADULT - CONSULT REASON
Right upper facial swelling
Uncontrolled diabetes  Takes insulin( uses as needed and doesn't remember the dose) and ozempic as home meds
Pain and swelling on upper right side of face
SBO vs enteritis
facial swelling/dental abscess

## 2025-06-09 NOTE — DISCHARGE NOTE NURSING/CASE MANAGEMENT/SOCIAL WORK - NSDCPEFALRISK_GEN_ALL_CORE
For information on Fall & Injury Prevention, visit: [email or web address] OR  [email or web address] OR  https://www.cdc.gov/steadi/patient.html

## 2025-06-09 NOTE — DISCHARGE NOTE NURSING/CASE MANAGEMENT/SOCIAL WORK - PATIENT PORTAL LINK FT
You can access the FollowMyHealth Patient Portal offered by [facility] by registering at the following website: [email or web address]. By joining [facility]’s FollowMyHealth portal, you will also be able to view your health information using other applications (apps) compatible with our system.

## 2025-06-09 NOTE — CONSULT NOTE ADULT - ASSESSMENT
Patient is a 65y old  Male who presents with a chief complaint of hyperglycemia and abd pain (2025 13:36). Dental was consulted on 25; incision & drainage was performed to treat upper right sided facial swelling. Patient was then transported down to dental clinic at [facility] for further evaluation and definitive treatment.    HPI:  65-year-old male PMH diabetes (on insulin) and asthma coming to ED for vomiting, diarrhea, abdominal pain.  Patient reports for the last 2 days has had generalized abdominal pain with multiple episodes of nonbloody nonbilious vomiting, and nonbloody diarrhea.  Patient endorses increased thirst and urinary frequency.  Reports noncompliance with insulin ( takes insulin prn, doesn't remember dose or how often he is supposed to take it) and blood sugar at home has been >400.  Denies full/trauma, fever, chest pain, shortness of breath, dysuria, hematuria.    Vitals: Afebrile, HR 83, BP: 110/73, RR: 18, Satting at 100% RA  Labs: WBC: 3.24 Hb.3 Plt:245, Na:138 K:4.5 Crea:1.3 Anion Gap:16 BUN:23 Glucose 392, Lipase: 90, BHB:204  VBG: pH: 7.39 Base excess: -3.1 pCO2:35 HCO3:21  UA: 40 Ketone, trace blood, >1000 glucose  Imaging:  CXR: Neg for acute processes  CT A/P: Mildly dilated loops of small bowel with fluid. Relative area of   transition with collapsed loops of bowel seen distally. Findings may   reflect small bowel obstruction. Thickened bowel wall may reflect an   enteritis. Air and stool within the colon may reflect an early or partial   small bowel obstruction.    s/p:  1L sodium chloride bolus x2 and 10 units of humalin. Admitted to medicine.     (2025 21:19)      PAST MEDICAL & SURGICAL HISTORY:  Diabetes  Asthma  MAYLIN (obstructive sleep apnea)    No significant past surgical history    MEDICATIONS  (STANDING):  albuterol    90 MICROgram(s) HFA Inhaler 1 Puff(s) Inhalation daily  ampicillin/sulbactam  IVPB      ampicillin/sulbactam  IVPB 3 Gram(s) IV Intermittent every 6 hours  brimonidine 0.2% Ophthalmic Solution 1 Drop(s) Both EYES daily  dextrose 5%. 1000 milliLiter(s) (50 mL/Hr) IV Continuous <Continuous>  dextrose 5%. 1000 milliLiter(s) (100 mL/Hr) IV Continuous <Continuous>  dextrose 50% Injectable 25 Gram(s) IV Push once  dextrose 50% Injectable 12.5 Gram(s) IV Push once  dextrose 50% Injectable 25 Gram(s) IV Push once  dextrose 50% Injectable 25 Gram(s) IV Push once  glucagon  Injectable 1 milliGRAM(s) IntraMuscular once  heparin   Injectable 5000 Unit(s) SubCutaneous every 12 hours  insulin glargine Injectable (LANTUS) 35 Unit(s) SubCutaneous at bedtime  insulin lispro (ADMELOG) corrective regimen sliding scale   SubCutaneous three times a day before meals  insulin lispro Injectable (ADMELOG) 12 Unit(s) SubCutaneous three times a day before meals  polyethylene glycol 3350 17 Gram(s) Oral two times a day  timolol 0.5% Solution 1 Drop(s) Both EYES daily    MEDICATIONS  (PRN):  acetaminophen     Tablet .. 650 milliGRAM(s) Oral every 6 hours PRN Temp greater or equal to 38C (100.4F), Mild Pain (1 - 3)  aluminum hydroxide/magnesium hydroxide/simethicone Suspension 30 milliLiter(s) Oral every 4 hours PRN Dyspepsia  dextrose Oral Gel 15 Gram(s) Oral once PRN Blood Glucose LESS THAN 70 milliGRAM(s)/deciliter  ibuprofen  Tablet. 600 milliGRAM(s) Oral every 6 hours PRN Temp greater or equal to 38C (100.4F), Moderate Pain (4 - 6)  melatonin 3 milliGRAM(s) Oral at bedtime PRN Insomnia  ondansetron Injectable 4 milliGRAM(s) IV Push every 8 hours PRN Nausea and/or Vomiting  oxycodone    5 mG/acetaminophen 325 mG 1 Tablet(s) Oral every 6 hours PRN Severe Pain (7 - 10)  polyethylene glycol 3350 17 Gram(s) Oral two times a day PRN Constipation    Allergies  No Known Allergies    Vital Signs Last 24 Hrs  T(C): 37 (2025 05:00), Max: 37.5 (2025 20:06)  T(F): 98.6 (2025 05:00), Max: 99.5 (2025 20:06)  HR: 66 (2025 05:00) (66 - 74)  BP: 115/71 (2025 05:00) (99/64 - 115/71)  BP(mean): --  RR: 18 (2025 05:00) (16 - 18)  SpO2: 100% (2025 05:00) (98% - 100%)    Parameters below as of 2025 05:00  Patient On (Oxygen Delivery Method): room air    WBC Count: 4.05 K/uL *L* [4.80 - 10.80] ( @ 06:57)  Platelet Count - Automated: 219 K/uL [130 - 400] ( @ 06:57)    EOE:  TMJ (  - ) clicks                     ( - ) pops                     ( - ) crepitus             Mandible SHAYLA within normal limits             Facial bones and MOM <<grossly intact>>             ( - ) trismus             ( - ) lymphadenopathy             ( - ) swelling             ( - ) asymmetry             ( - ) palpation             ( - ) dyspnea             ( - ) dysphagia             ( - ) loss of consciousness    IOE:  <<permanent>> dentition: <<grossly intact>>            hard/soft palate: <<No pathology noted>>           tongue/FOM <<No pathology noted>>           labial/buccal mucosa <<No pathology noted>>           ( + ) percussion           ( + ) palpation           ( - ) swelling            ( - ) abscess           ( - ) sinus tract    *DENTAL RADIOGRAPHS: One panoramic xray taken, one periapical xray taken as well.    *ASSESSMENT: Clinical exam reveals tooth #4 is nonrestorable and has extensive decay. Explained to patient that tooth is nonrestorable and requires extraction, patient understands and agrees.     *PLAN: Extract tooth #4.    PROCEDURE:   Treatment risks and benefits explained as per OS sheet dated 2000. Consent obtained and side site completed.  Anesthesia: Administered two carpules 4% septocaine 1:100k epinephrine via local infiltration on buccal and palatal surfaces.  Treatment: Once adequately anesthetized, extracted tooth #4 using elevators and forceps. Curettaged and irrigated using saline solution. Placed one collagen plug to help achieve hemostasis, figure 8 chromic gut suture placed.      RECOMMENDATIONS:  1) Please continue previously prescribed antibiotics and pain medication.  2) Dental F/U with outpatient dentist for comprehensive dental care.     Resident Name, pager #: Zhane Messer DDS Spect[facility] [phone]

## 2025-06-13 DIAGNOSIS — G47.33 OBSTRUCTIVE SLEEP APNEA (ADULT) (PEDIATRIC): ICD-10-CM

## 2025-06-13 DIAGNOSIS — K59.00 CONSTIPATION, UNSPECIFIED: ICD-10-CM

## 2025-06-13 DIAGNOSIS — Z79.4 LONG TERM (CURRENT) USE OF INSULIN: ICD-10-CM

## 2025-06-13 DIAGNOSIS — K04.7 PERIAPICAL ABSCESS WITHOUT SINUS: ICD-10-CM

## 2025-06-13 DIAGNOSIS — J45.909 UNSPECIFIED ASTHMA, UNCOMPLICATED: ICD-10-CM

## 2025-06-13 DIAGNOSIS — E11.10 TYPE 2 DIABETES MELLITUS WITH KETOACIDOSIS WITHOUT COMA: ICD-10-CM

## 2025-06-13 DIAGNOSIS — K52.9 NONINFECTIVE GASTROENTERITIS AND COLITIS, UNSPECIFIED: ICD-10-CM

## 2025-09-15 ENCOUNTER — EMERGENCY (EMERGENCY)
Facility: HOSPITAL | Age: 65
LOS: 0 days | Discharge: HOME | End: 2025-09-15
Attending: STUDENT IN AN ORGANIZED HEALTH CARE EDUCATION/TRAINING PROGRAM
Payer: MEDICAID

## 2025-09-15 VITALS
HEART RATE: 79 BPM | SYSTOLIC BLOOD PRESSURE: 120 MMHG | HEIGHT: 71 IN | OXYGEN SATURATION: 100 % | WEIGHT: 235.01 LBS | RESPIRATION RATE: 18 BRPM | TEMPERATURE: 99 F | DIASTOLIC BLOOD PRESSURE: 72 MMHG

## 2025-09-15 DIAGNOSIS — L29.9 PRURITUS, UNSPECIFIED: ICD-10-CM

## 2025-09-15 DIAGNOSIS — R21 RASH AND OTHER NONSPECIFIC SKIN ERUPTION: ICD-10-CM

## 2025-09-15 PROCEDURE — 99284 EMERGENCY DEPT VISIT MOD MDM: CPT

## 2025-09-15 PROCEDURE — 99283 EMERGENCY DEPT VISIT LOW MDM: CPT
